# Patient Record
Sex: FEMALE | Race: WHITE | NOT HISPANIC OR LATINO | Employment: OTHER | URBAN - METROPOLITAN AREA
[De-identification: names, ages, dates, MRNs, and addresses within clinical notes are randomized per-mention and may not be internally consistent; named-entity substitution may affect disease eponyms.]

---

## 2021-03-15 DIAGNOSIS — Z23 NEED FOR VACCINATION: ICD-10-CM

## 2023-12-29 ENCOUNTER — APPOINTMENT (OUTPATIENT)
Dept: RADIOLOGY | Facility: MEDICAL CENTER | Age: 63
DRG: 377 | End: 2023-12-29
Attending: EMERGENCY MEDICINE

## 2023-12-29 ENCOUNTER — HOSPITAL ENCOUNTER (INPATIENT)
Facility: MEDICAL CENTER | Age: 63
LOS: 6 days | DRG: 377 | End: 2024-01-04
Attending: EMERGENCY MEDICINE | Admitting: INTERNAL MEDICINE

## 2023-12-29 ENCOUNTER — APPOINTMENT (OUTPATIENT)
Dept: RADIOLOGY | Facility: MEDICAL CENTER | Age: 63
DRG: 377 | End: 2023-12-29
Attending: INTERNAL MEDICINE

## 2023-12-29 DIAGNOSIS — G93.40 HEMORRHAGIC SHOCK AND ENCEPHALOPATHY SYNDROME (HCC): ICD-10-CM

## 2023-12-29 DIAGNOSIS — Z51.5 HOSPICE CARE: ICD-10-CM

## 2023-12-29 DIAGNOSIS — C16.8 MALIGNANT NEOPLASM OF OVERLAPPING SITES OF STOMACH (HCC): ICD-10-CM

## 2023-12-29 DIAGNOSIS — C16.9 MALIGNANT NEOPLASM OF STOMACH, UNSPECIFIED LOCATION (HCC): ICD-10-CM

## 2023-12-29 DIAGNOSIS — E87.20 METABOLIC ACIDOSIS: ICD-10-CM

## 2023-12-29 DIAGNOSIS — Z51.5 END OF LIFE CARE: ICD-10-CM

## 2023-12-29 DIAGNOSIS — R57.8 HEMORRHAGIC SHOCK AND ENCEPHALOPATHY SYNDROME (HCC): ICD-10-CM

## 2023-12-29 DIAGNOSIS — D50.0 IRON DEFICIENCY ANEMIA DUE TO CHRONIC BLOOD LOSS: ICD-10-CM

## 2023-12-29 DIAGNOSIS — Q87.89 HEMORRHAGIC SHOCK AND ENCEPHALOPATHY SYNDROME (HCC): ICD-10-CM

## 2023-12-29 DIAGNOSIS — J96.00 ACUTE RESPIRATORY FAILURE, UNSPECIFIED WHETHER WITH HYPOXIA OR HYPERCAPNIA (HCC): ICD-10-CM

## 2023-12-29 PROBLEM — D72.829 LEUKOCYTOSIS: Status: ACTIVE | Noted: 2023-12-29

## 2023-12-29 PROBLEM — J96.01 ACUTE RESPIRATORY FAILURE WITH HYPOXIA (HCC): Status: ACTIVE | Noted: 2023-12-29

## 2023-12-29 PROBLEM — R74.8 ABNORMAL TRANSAMINASES: Status: ACTIVE | Noted: 2023-12-29

## 2023-12-29 PROBLEM — D64.9 SEVERE ANEMIA: Status: ACTIVE | Noted: 2023-12-29

## 2023-12-29 PROBLEM — N17.9 ACUTE KIDNEY INJURY (HCC): Status: ACTIVE | Noted: 2023-12-29

## 2023-12-29 LAB
ABO + RH BLD: NORMAL
ABO GROUP BLD: NORMAL
ALBUMIN SERPL BCP-MCNC: 2.7 G/DL (ref 3.2–4.9)
ALBUMIN SERPL BCP-MCNC: 2.8 G/DL (ref 3.2–4.9)
ALBUMIN/GLOB SERPL: 1.1 G/DL
ALBUMIN/GLOB SERPL: 1.2 G/DL
ALP SERPL-CCNC: 163 U/L (ref 30–99)
ALP SERPL-CCNC: 279 U/L (ref 30–99)
ALT SERPL-CCNC: 639 U/L (ref 2–50)
ALT SERPL-CCNC: 720 U/L (ref 2–50)
AMMONIA PLAS-SCNC: 263 UMOL/L (ref 11–45)
AMORPH CRY #/AREA URNS HPF: PRESENT /HPF
AMPHET UR QL SCN: NEGATIVE
ANION GAP SERPL CALC-SCNC: 20 MMOL/L (ref 7–16)
ANION GAP SERPL CALC-SCNC: 22 MMOL/L (ref 7–16)
ANION GAP SERPL CALC-SCNC: 31 MMOL/L (ref 7–16)
ANION GAP SERPL CALC-SCNC: 33 MMOL/L (ref 7–16)
APPEARANCE UR: ABNORMAL
APTT PPP: 26.2 SEC (ref 24.7–36)
ARTERIAL PATENCY WRIST A: ABNORMAL
ARTERIAL PATENCY WRIST A: ABNORMAL
AST SERPL-CCNC: 1719 U/L (ref 12–45)
AST SERPL-CCNC: 2399 U/L (ref 12–45)
B-OH-BUTYR SERPL-MCNC: 1.1 MMOL/L (ref 0.02–0.27)
BACTERIA #/AREA URNS HPF: ABNORMAL /HPF
BARBITURATES UR QL SCN: NEGATIVE
BARCODED ABORH UBTYP: 5100
BARCODED ABORH UBTYP: 6200
BARCODED PRD CODE UBPRD: NORMAL
BARCODED UNIT NUM UBUNT: NORMAL
BASE EXCESS BLDA CALC-SCNC: -16 MMOL/L (ref -4–3)
BASE EXCESS BLDA CALC-SCNC: -28 MMOL/L (ref -4–3)
BASOPHILS # BLD AUTO: 0.1 % (ref 0–1.8)
BASOPHILS # BLD: 0.04 K/UL (ref 0–0.12)
BENZODIAZ UR QL SCN: NEGATIVE
BILIRUB SERPL-MCNC: 0.5 MG/DL (ref 0.1–1.5)
BILIRUB SERPL-MCNC: 1.1 MG/DL (ref 0.1–1.5)
BILIRUB UR QL STRIP.AUTO: NEGATIVE
BLD GP AB SCN SERPL QL: NORMAL
BODY TEMPERATURE: ABNORMAL DEGREES
BODY TEMPERATURE: ABNORMAL DEGREES
BREATHS SETTING VENT: 24
BREATHS SETTING VENT: 32
BUN SERPL-MCNC: 50 MG/DL (ref 8–22)
BUN SERPL-MCNC: 51 MG/DL (ref 8–22)
BUN SERPL-MCNC: 54 MG/DL (ref 8–22)
BUN SERPL-MCNC: 58 MG/DL (ref 8–22)
BZE UR QL SCN: NEGATIVE
CA-I BLD ISE-SCNC: 1.27 MMOL/L (ref 1.1–1.3)
CA-I BLD ISE-SCNC: 1.28 MMOL/L (ref 1.1–1.3)
CA-I SERPL-SCNC: 1.1 MMOL/L (ref 1.1–1.3)
CA-I SERPL-SCNC: 1.3 MMOL/L (ref 1.1–1.3)
CALCIUM ALBUM COR SERPL-MCNC: 10 MG/DL (ref 8.5–10.5)
CALCIUM ALBUM COR SERPL-MCNC: 10.4 MG/DL (ref 8.5–10.5)
CALCIUM SERPL-MCNC: 8.2 MG/DL (ref 8.5–10.5)
CALCIUM SERPL-MCNC: 8.3 MG/DL (ref 8.5–10.5)
CALCIUM SERPL-MCNC: 9 MG/DL (ref 8.5–10.5)
CALCIUM SERPL-MCNC: 9.4 MG/DL (ref 8.5–10.5)
CANNABINOIDS UR QL SCN: POSITIVE
CFT BLD TEG: 10.3 MIN (ref 4.6–9.1)
CFT BLD TEG: 6.5 MIN (ref 4.6–9.1)
CFT P HPASE BLD TEG: 6.2 MIN (ref 4.3–8.3)
CFT P HPASE BLD TEG: 9.2 MIN (ref 4.3–8.3)
CHLORIDE SERPL-SCNC: 100 MMOL/L (ref 96–112)
CHLORIDE SERPL-SCNC: 98 MMOL/L (ref 96–112)
CHLORIDE SERPL-SCNC: 98 MMOL/L (ref 96–112)
CHLORIDE SERPL-SCNC: 99 MMOL/L (ref 96–112)
CK SERPL-CCNC: 116 U/L (ref 0–154)
CLOT ANGLE BLD TEG: 62.8 DEGREES (ref 63–78)
CLOT ANGLE BLD TEG: 78.4 DEGREES (ref 63–78)
CLOT LYSIS 30M P MA LENFR BLD TEG: 0 % (ref 0–2.6)
CLOT LYSIS 30M P MA LENFR BLD TEG: 0 % (ref 0–2.6)
CO2 BLDA-SCNC: 11 MMOL/L (ref 20–33)
CO2 BLDA-SCNC: <10 MMOL/L (ref 20–33)
CO2 SERPL-SCNC: 13 MMOL/L (ref 20–33)
CO2 SERPL-SCNC: 15 MMOL/L (ref 20–33)
CO2 SERPL-SCNC: 5 MMOL/L (ref 20–33)
CO2 SERPL-SCNC: 7 MMOL/L (ref 20–33)
COLOR UR: YELLOW
COMPONENT F 8504F: NORMAL
COMPONENT P 8504P: NORMAL
COMPONENT R 8504R: NORMAL
CORTIS SERPL-MCNC: 58.9 UG/DL (ref 0–23)
CREAT SERPL-MCNC: 1.71 MG/DL (ref 0.5–1.4)
CREAT SERPL-MCNC: 1.79 MG/DL (ref 0.5–1.4)
CREAT SERPL-MCNC: 1.87 MG/DL (ref 0.5–1.4)
CREAT SERPL-MCNC: 1.93 MG/DL (ref 0.5–1.4)
CT.EXTRINSIC BLD ROTEM: 0.8 MIN (ref 0.8–2.1)
CT.EXTRINSIC BLD ROTEM: 2.6 MIN (ref 0.8–2.1)
DELSYS IDSYS: ABNORMAL
DELSYS IDSYS: ABNORMAL
EKG IMPRESSION: NORMAL
END TIDAL CARBON DIOXIDE IECO2: 21 MMHG
EOSINOPHIL # BLD AUTO: 1.69 K/UL (ref 0–0.51)
EOSINOPHIL NFR BLD: 3.9 % (ref 0–6.9)
EPI CELLS #/AREA URNS HPF: ABNORMAL /HPF
ERYTHROCYTE [DISTWIDTH] IN BLOOD BY AUTOMATED COUNT: 55.6 FL (ref 35.9–50)
ERYTHROCYTE [DISTWIDTH] IN BLOOD BY AUTOMATED COUNT: 56.1 FL (ref 35.9–50)
ERYTHROCYTE [DISTWIDTH] IN BLOOD BY AUTOMATED COUNT: 57.6 FL (ref 35.9–50)
ERYTHROCYTE [DISTWIDTH] IN BLOOD BY AUTOMATED COUNT: 63 FL (ref 35.9–50)
ETHANOL BLD-MCNC: <10.1 MG/DL
FENTANYL UR QL: NEGATIVE
FIBRINOGEN PPP-MCNC: 185 MG/DL (ref 215–460)
GFR SERPLBLD CREATININE-BSD FMLA CKD-EPI: 29 ML/MIN/1.73 M 2
GFR SERPLBLD CREATININE-BSD FMLA CKD-EPI: 30 ML/MIN/1.73 M 2
GFR SERPLBLD CREATININE-BSD FMLA CKD-EPI: 32 ML/MIN/1.73 M 2
GFR SERPLBLD CREATININE-BSD FMLA CKD-EPI: 34 ML/MIN/1.73 M 2
GLOBULIN SER CALC-MCNC: 2.4 G/DL (ref 1.9–3.5)
GLOBULIN SER CALC-MCNC: 2.5 G/DL (ref 1.9–3.5)
GLUCOSE BLD STRIP.AUTO-MCNC: 331 MG/DL (ref 65–99)
GLUCOSE SERPL-MCNC: 141 MG/DL (ref 65–99)
GLUCOSE SERPL-MCNC: 141 MG/DL (ref 65–99)
GLUCOSE SERPL-MCNC: 369 MG/DL (ref 65–99)
GLUCOSE SERPL-MCNC: 461 MG/DL (ref 65–99)
GLUCOSE UR STRIP.AUTO-MCNC: NEGATIVE MG/DL
HCO3 BLDA-SCNC: 3.5 MMOL/L (ref 17–25)
HCO3 BLDA-SCNC: 9.9 MMOL/L (ref 17–25)
HCT VFR BLD AUTO: 23.8 % (ref 37–47)
HCT VFR BLD AUTO: 24 % (ref 37–47)
HCT VFR BLD AUTO: 27.6 % (ref 37–47)
HCT VFR BLD AUTO: 9.3 % (ref 37–47)
HGB BLD-MCNC: 2.1 G/DL (ref 12–16)
HGB BLD-MCNC: 7.9 G/DL (ref 12–16)
HGB BLD-MCNC: 8.3 G/DL (ref 12–16)
HGB BLD-MCNC: 8.5 G/DL (ref 12–16)
HOROWITZ INDEX BLDA+IHG-RTO: 335 MM[HG]
HOROWITZ INDEX BLDA+IHG-RTO: 473 MM[HG]
HYALINE CASTS #/AREA URNS LPF: ABNORMAL /LPF
IMM GRANULOCYTES # BLD AUTO: 7.11 K/UL (ref 0–0.11)
IMM GRANULOCYTES NFR BLD AUTO: 16.5 % (ref 0–0.9)
INR PPP: 1.82 (ref 0.87–1.13)
INR PPP: 2.03 (ref 0.87–1.13)
KETONES UR STRIP.AUTO-MCNC: ABNORMAL MG/DL
LACTATE SERPL-SCNC: 13.2 MMOL/L (ref 0.5–2)
LACTATE SERPL-SCNC: 20.1 MMOL/L (ref 0.5–2)
LACTATE SERPL-SCNC: 4.4 MMOL/L (ref 0.5–2)
LACTATE SERPL-SCNC: 7.2 MMOL/L (ref 0.5–2)
LDH SERPL L TO P-CCNC: >2500 U/L (ref 107–266)
LEUKOCYTE ESTERASE UR QL STRIP.AUTO: NEGATIVE
LIPASE SERPL-CCNC: 44 U/L (ref 11–82)
LYMPHOCYTES # BLD AUTO: 6.41 K/UL (ref 1–4.8)
LYMPHOCYTES NFR BLD: 14.9 % (ref 22–41)
MAGNESIUM SERPL-MCNC: 3.1 MG/DL (ref 1.5–2.5)
MCF BLD TEG: 62.2 MM (ref 52–69)
MCF BLD TEG: ABNORMAL MM (ref 52–69)
MCF.PLATELET INHIB BLD ROTEM: 17.2 MM (ref 15–32)
MCF.PLATELET INHIB BLD ROTEM: 29 MM (ref 15–32)
MCH RBC QN AUTO: 18.4 PG (ref 27–33)
MCH RBC QN AUTO: 25.6 PG (ref 27–33)
MCH RBC QN AUTO: 25.9 PG (ref 27–33)
MCH RBC QN AUTO: 26 PG (ref 27–33)
MCHC RBC AUTO-ENTMCNC: 22.6 G/DL (ref 32.2–35.5)
MCHC RBC AUTO-ENTMCNC: 30.8 G/DL (ref 32.2–35.5)
MCHC RBC AUTO-ENTMCNC: 32.9 G/DL (ref 32.2–35.5)
MCHC RBC AUTO-ENTMCNC: 34.9 G/DL (ref 32.2–35.5)
MCV RBC AUTO: 74.6 FL (ref 81.4–97.8)
MCV RBC AUTO: 77.9 FL (ref 81.4–97.8)
MCV RBC AUTO: 81.6 FL (ref 81.4–97.8)
MCV RBC AUTO: 84.1 FL (ref 81.4–97.8)
METHADONE UR QL SCN: NEGATIVE
MICRO URNS: ABNORMAL
MODE IMODE: ABNORMAL
MODE IMODE: ABNORMAL
MONOCYTES # BLD AUTO: 3.12 K/UL (ref 0–0.85)
MONOCYTES NFR BLD AUTO: 7.2 % (ref 0–13.4)
NEUTROPHILS # BLD AUTO: 24.73 K/UL (ref 1.82–7.42)
NEUTROPHILS NFR BLD: 57.4 % (ref 44–72)
NITRITE UR QL STRIP.AUTO: NEGATIVE
NRBC # BLD AUTO: 4.02 K/UL
NRBC BLD-RTO: 9.3 /100 WBC (ref 0–0.2)
O2/TOTAL GAS SETTING VFR VENT: 100 %
O2/TOTAL GAS SETTING VFR VENT: 100 %
OPIATES UR QL SCN: NEGATIVE
OXYCODONE UR QL SCN: NEGATIVE
PA AA BLD-ACNC: 33.7 % (ref 0–11)
PA AA BLD-ACNC: ABNORMAL % (ref 0–11)
PA ADP BLD-ACNC: ABNORMAL % (ref 0–17)
PA ADP BLD-ACNC: ABNORMAL % (ref 0–17)
PCO2 BLDA: 22.7 MMHG (ref 26–37)
PCO2 BLDA: 26.5 MMHG (ref 26–37)
PCO2 TEMP ADJ BLDA: 20.5 MMHG (ref 26–37)
PCO2 TEMP ADJ BLDA: 24.4 MMHG (ref 26–37)
PCP UR QL SCN: NEGATIVE
PEEP END EXPIRATORY PRESSURE IPEEP: 8 CMH20
PEEP END EXPIRATORY PRESSURE IPEEP: 8 CMH20
PH BLDA: 6.73 [PH] (ref 7.4–7.5)
PH BLDA: 7.25 [PH] (ref 7.4–7.5)
PH TEMP ADJ BLDA: 6.75 [PH] (ref 7.4–7.5)
PH TEMP ADJ BLDA: 7.28 [PH] (ref 7.4–7.5)
PH UR STRIP.AUTO: 5 [PH] (ref 5–8)
PHOSPHATE SERPL-MCNC: 9.9 MG/DL (ref 2.5–4.5)
PLATELET # BLD AUTO: 182 K/UL (ref 164–446)
PLATELET # BLD AUTO: 183 K/UL (ref 164–446)
PLATELET # BLD AUTO: 233 K/UL (ref 164–446)
PLATELET # BLD AUTO: 513 K/UL (ref 164–446)
PMV BLD AUTO: 9 FL (ref 9–12.9)
PMV BLD AUTO: 9.2 FL (ref 9–12.9)
PMV BLD AUTO: 9.4 FL (ref 9–12.9)
PMV BLD AUTO: 9.4 FL (ref 9–12.9)
PO2 BLDA: 335 MMHG (ref 64–87)
PO2 BLDA: 473 MMHG (ref 64–87)
PO2 TEMP ADJ BLDA: 326 MMHG (ref 64–87)
PO2 TEMP ADJ BLDA: 458 MMHG (ref 64–87)
POTASSIUM BLD-SCNC: 4.7 MMOL/L (ref 3.6–5.5)
POTASSIUM BLD-SCNC: 5.1 MMOL/L (ref 3.6–5.5)
POTASSIUM SERPL-SCNC: 4.5 MMOL/L (ref 3.6–5.5)
POTASSIUM SERPL-SCNC: 4.6 MMOL/L (ref 3.6–5.5)
POTASSIUM SERPL-SCNC: 4.9 MMOL/L (ref 3.6–5.5)
POTASSIUM SERPL-SCNC: 5.4 MMOL/L (ref 3.6–5.5)
PRODUCT TYPE UPROD: NORMAL
PROPOXYPH UR QL SCN: NEGATIVE
PROT SERPL-MCNC: 5.2 G/DL (ref 6–8.2)
PROT SERPL-MCNC: 5.2 G/DL (ref 6–8.2)
PROT UR QL STRIP: 100 MG/DL
PROTHROMBIN TIME: 21.3 SEC (ref 12–14.6)
PROTHROMBIN TIME: 23.2 SEC (ref 12–14.6)
RBC # BLD AUTO: 1.14 M/UL (ref 4.2–5.4)
RBC # BLD AUTO: 3.08 M/UL (ref 4.2–5.4)
RBC # BLD AUTO: 3.19 M/UL (ref 4.2–5.4)
RBC # BLD AUTO: 3.28 M/UL (ref 4.2–5.4)
RBC # URNS HPF: ABNORMAL /HPF
RBC UR QL AUTO: ABNORMAL
RENAL EPI CELLS #/AREA URNS HPF: ABNORMAL /HPF
RH BLD: NORMAL
SAO2 % BLDA: 100 % (ref 93–99)
SAO2 % BLDA: 100 % (ref 93–99)
SODIUM BLD-SCNC: 131 MMOL/L (ref 135–145)
SODIUM BLD-SCNC: 134 MMOL/L (ref 135–145)
SODIUM SERPL-SCNC: 133 MMOL/L (ref 135–145)
SODIUM SERPL-SCNC: 135 MMOL/L (ref 135–145)
SODIUM SERPL-SCNC: 136 MMOL/L (ref 135–145)
SODIUM SERPL-SCNC: 137 MMOL/L (ref 135–145)
SP GR UR STRIP.AUTO: 1.02
SPECIMEN DRAWN FROM PATIENT: ABNORMAL
SPECIMEN DRAWN FROM PATIENT: ABNORMAL
TEG ALGORITHM TGALG: ABNORMAL
TEG ALGORITHM TGALG: ABNORMAL
TIDAL VOLUME IVT: 350 ML
TIDAL VOLUME IVT: 350 ML
TRIGL SERPL-MCNC: 83 MG/DL (ref 0–149)
TROPONIN T SERPL-MCNC: 31 NG/L (ref 6–19)
TSH SERPL DL<=0.005 MIU/L-ACNC: 3.03 UIU/ML (ref 0.38–5.33)
UNIT STATUS USTAT: NORMAL
URATE SERPL-MCNC: 13.6 MG/DL (ref 1.9–8.2)
UROBILINOGEN UR STRIP.AUTO-MCNC: 1 MG/DL
WBC # BLD AUTO: 25.1 K/UL (ref 4.8–10.8)
WBC # BLD AUTO: 26.7 K/UL (ref 4.8–10.8)
WBC # BLD AUTO: 29.9 K/UL (ref 4.8–10.8)
WBC # BLD AUTO: 43.1 K/UL (ref 4.8–10.8)
WBC #/AREA URNS HPF: ABNORMAL /HPF

## 2023-12-29 PROCEDURE — 85384 FIBRINOGEN ACTIVITY: CPT | Mod: 91

## 2023-12-29 PROCEDURE — 82330 ASSAY OF CALCIUM: CPT | Mod: 91

## 2023-12-29 PROCEDURE — 96367 TX/PROPH/DG ADDL SEQ IV INF: CPT

## 2023-12-29 PROCEDURE — 83605 ASSAY OF LACTIC ACID: CPT | Mod: 91

## 2023-12-29 PROCEDURE — 83735 ASSAY OF MAGNESIUM: CPT

## 2023-12-29 PROCEDURE — 36430 TRANSFUSION BLD/BLD COMPNT: CPT

## 2023-12-29 PROCEDURE — 84295 ASSAY OF SERUM SODIUM: CPT | Mod: 91

## 2023-12-29 PROCEDURE — 700105 HCHG RX REV CODE 258: Performed by: INTERNAL MEDICINE

## 2023-12-29 PROCEDURE — 0BH17EZ INSERTION OF ENDOTRACHEAL AIRWAY INTO TRACHEA, VIA NATURAL OR ARTIFICIAL OPENING: ICD-10-PCS | Performed by: EMERGENCY MEDICINE

## 2023-12-29 PROCEDURE — 82077 ASSAY SPEC XCP UR&BREATH IA: CPT

## 2023-12-29 PROCEDURE — 85610 PROTHROMBIN TIME: CPT

## 2023-12-29 PROCEDURE — XW0G886 INTRODUCTION OF MINERAL-BASED TOPICAL HEMOSTATIC AGENT INTO UPPER GI, VIA NATURAL OR ARTIFICIAL OPENING ENDOSCOPIC, NEW TECHNOLOGY GROUP 6: ICD-10-PCS | Performed by: INTERNAL MEDICINE

## 2023-12-29 PROCEDURE — 770022 HCHG ROOM/CARE - ICU (200)

## 2023-12-29 PROCEDURE — 02HV33Z INSERTION OF INFUSION DEVICE INTO SUPERIOR VENA CAVA, PERCUTANEOUS APPROACH: ICD-10-PCS | Performed by: EMERGENCY MEDICINE

## 2023-12-29 PROCEDURE — 94002 VENT MGMT INPAT INIT DAY: CPT

## 2023-12-29 PROCEDURE — 36600 WITHDRAWAL OF ARTERIAL BLOOD: CPT

## 2023-12-29 PROCEDURE — 96375 TX/PRO/DX INJ NEW DRUG ADDON: CPT

## 2023-12-29 PROCEDURE — 84443 ASSAY THYROID STIM HORMONE: CPT

## 2023-12-29 PROCEDURE — 03HY32Z INSERTION OF MONITORING DEVICE INTO UPPER ARTERY, PERCUTANEOUS APPROACH: ICD-10-PCS | Performed by: INTERNAL MEDICINE

## 2023-12-29 PROCEDURE — 94799 UNLISTED PULMONARY SVC/PX: CPT

## 2023-12-29 PROCEDURE — 86901 BLOOD TYPING SEROLOGIC RH(D): CPT

## 2023-12-29 PROCEDURE — 88342 IMHCHEM/IMCYTCHM 1ST ANTB: CPT

## 2023-12-29 PROCEDURE — 700111 HCHG RX REV CODE 636 W/ 250 OVERRIDE (IP): Performed by: EMERGENCY MEDICINE

## 2023-12-29 PROCEDURE — 99291 CRITICAL CARE FIRST HOUR: CPT | Mod: 25 | Performed by: INTERNAL MEDICINE

## 2023-12-29 PROCEDURE — 700105 HCHG RX REV CODE 258: Performed by: EMERGENCY MEDICINE

## 2023-12-29 PROCEDURE — 86850 RBC ANTIBODY SCREEN: CPT

## 2023-12-29 PROCEDURE — 700111 HCHG RX REV CODE 636 W/ 250 OVERRIDE (IP): Performed by: INTERNAL MEDICINE

## 2023-12-29 PROCEDURE — 700117 HCHG RX CONTRAST REV CODE 255: Performed by: INTERNAL MEDICINE

## 2023-12-29 PROCEDURE — 84132 ASSAY OF SERUM POTASSIUM: CPT

## 2023-12-29 PROCEDURE — 86900 BLOOD TYPING SEROLOGIC ABO: CPT

## 2023-12-29 PROCEDURE — 700101 HCHG RX REV CODE 250

## 2023-12-29 PROCEDURE — 74176 CT ABD & PELVIS W/O CONTRAST: CPT

## 2023-12-29 PROCEDURE — C9113 INJ PANTOPRAZOLE SODIUM, VIA: HCPCS | Performed by: EMERGENCY MEDICINE

## 2023-12-29 PROCEDURE — 160202 HCHG ENDO MINUTES - 1ST 30 MINS LEVEL 3: Performed by: INTERNAL MEDICINE

## 2023-12-29 PROCEDURE — 71260 CT THORAX DX C+: CPT

## 2023-12-29 PROCEDURE — 83615 LACTATE (LD) (LDH) ENZYME: CPT

## 2023-12-29 PROCEDURE — 86923 COMPATIBILITY TEST ELECTRIC: CPT

## 2023-12-29 PROCEDURE — 71045 X-RAY EXAM CHEST 1 VIEW: CPT

## 2023-12-29 PROCEDURE — 99255 IP/OBS CONSLTJ NEW/EST HI 80: CPT | Mod: 25 | Performed by: INTERNAL MEDICINE

## 2023-12-29 PROCEDURE — 88305 TISSUE EXAM BY PATHOLOGIST: CPT

## 2023-12-29 PROCEDURE — 30233R1 TRANSFUSION OF NONAUTOLOGOUS PLATELETS INTO PERIPHERAL VEIN, PERCUTANEOUS APPROACH: ICD-10-PCS | Performed by: STUDENT IN AN ORGANIZED HEALTH CARE EDUCATION/TRAINING PROGRAM

## 2023-12-29 PROCEDURE — 84100 ASSAY OF PHOSPHORUS: CPT

## 2023-12-29 PROCEDURE — 36620 INSERTION CATHETER ARTERY: CPT | Performed by: INTERNAL MEDICINE

## 2023-12-29 PROCEDURE — 160048 HCHG OR STATISTICAL LEVEL 1-5: Performed by: INTERNAL MEDICINE

## 2023-12-29 PROCEDURE — 82962 GLUCOSE BLOOD TEST: CPT

## 2023-12-29 PROCEDURE — 30233N1 TRANSFUSION OF NONAUTOLOGOUS RED BLOOD CELLS INTO PERIPHERAL VEIN, PERCUTANEOUS APPROACH: ICD-10-PCS | Performed by: STUDENT IN AN ORGANIZED HEALTH CARE EDUCATION/TRAINING PROGRAM

## 2023-12-29 PROCEDURE — 85384 FIBRINOGEN ACTIVITY: CPT

## 2023-12-29 PROCEDURE — 99291 CRITICAL CARE FIRST HOUR: CPT

## 2023-12-29 PROCEDURE — 37799 UNLISTED PX VASCULAR SURGERY: CPT

## 2023-12-29 PROCEDURE — P9016 RBC LEUKOCYTES REDUCED: HCPCS

## 2023-12-29 PROCEDURE — 85730 THROMBOPLASTIN TIME PARTIAL: CPT

## 2023-12-29 PROCEDURE — 36620 INSERTION CATHETER ARTERY: CPT

## 2023-12-29 PROCEDURE — 700101 HCHG RX REV CODE 250: Performed by: INTERNAL MEDICINE

## 2023-12-29 PROCEDURE — P9034 PLATELETS, PHERESIS: HCPCS

## 2023-12-29 PROCEDURE — 81001 URINALYSIS AUTO W/SCOPE: CPT

## 2023-12-29 PROCEDURE — 85025 COMPLETE CBC W/AUTO DIFF WBC: CPT

## 2023-12-29 PROCEDURE — 96376 TX/PRO/DX INJ SAME DRUG ADON: CPT

## 2023-12-29 PROCEDURE — 0DB68ZX EXCISION OF STOMACH, VIA NATURAL OR ARTIFICIAL OPENING ENDOSCOPIC, DIAGNOSTIC: ICD-10-PCS | Performed by: INTERNAL MEDICINE

## 2023-12-29 PROCEDURE — 82803 BLOOD GASES ANY COMBINATION: CPT

## 2023-12-29 PROCEDURE — 85576 BLOOD PLATELET AGGREGATION: CPT

## 2023-12-29 PROCEDURE — 31500 INSERT EMERGENCY AIRWAY: CPT

## 2023-12-29 PROCEDURE — 93005 ELECTROCARDIOGRAM TRACING: CPT | Performed by: INTERNAL MEDICINE

## 2023-12-29 PROCEDURE — C1751 CATH, INF, PER/CENT/MIDLINE: HCPCS

## 2023-12-29 PROCEDURE — 36415 COLL VENOUS BLD VENIPUNCTURE: CPT

## 2023-12-29 PROCEDURE — 82010 KETONE BODYS QUAN: CPT

## 2023-12-29 PROCEDURE — 84484 ASSAY OF TROPONIN QUANT: CPT

## 2023-12-29 PROCEDURE — 83690 ASSAY OF LIPASE: CPT

## 2023-12-29 PROCEDURE — 36556 INSERT NON-TUNNEL CV CATH: CPT

## 2023-12-29 PROCEDURE — 93010 ELECTROCARDIOGRAM REPORT: CPT | Performed by: INTERNAL MEDICINE

## 2023-12-29 PROCEDURE — P9017 PLASMA 1 DONOR FRZ W/IN 8 HR: HCPCS | Mod: 91

## 2023-12-29 PROCEDURE — 84550 ASSAY OF BLOOD/URIC ACID: CPT

## 2023-12-29 PROCEDURE — 82533 TOTAL CORTISOL: CPT

## 2023-12-29 PROCEDURE — 700102 HCHG RX REV CODE 250 W/ 637 OVERRIDE(OP): Performed by: INTERNAL MEDICINE

## 2023-12-29 PROCEDURE — 80048 BASIC METABOLIC PNL TOTAL CA: CPT

## 2023-12-29 PROCEDURE — 99152 MOD SED SAME PHYS/QHP 5/>YRS: CPT

## 2023-12-29 PROCEDURE — 5A1935Z RESPIRATORY VENTILATION, LESS THAN 24 CONSECUTIVE HOURS: ICD-10-PCS | Performed by: EMERGENCY MEDICINE

## 2023-12-29 PROCEDURE — 43255 EGD CONTROL BLEEDING ANY: CPT | Performed by: INTERNAL MEDICINE

## 2023-12-29 PROCEDURE — 82140 ASSAY OF AMMONIA: CPT

## 2023-12-29 PROCEDURE — 70450 CT HEAD/BRAIN W/O DYE: CPT

## 2023-12-29 PROCEDURE — 94003 VENT MGMT INPAT SUBQ DAY: CPT

## 2023-12-29 PROCEDURE — 82550 ASSAY OF CK (CPK): CPT

## 2023-12-29 PROCEDURE — 87040 BLOOD CULTURE FOR BACTERIA: CPT | Mod: 91

## 2023-12-29 PROCEDURE — 80307 DRUG TEST PRSMV CHEM ANLYZR: CPT

## 2023-12-29 PROCEDURE — 700101 HCHG RX REV CODE 250: Performed by: EMERGENCY MEDICINE

## 2023-12-29 PROCEDURE — 85027 COMPLETE CBC AUTOMATED: CPT | Mod: 91

## 2023-12-29 PROCEDURE — 43239 EGD BIOPSY SINGLE/MULTIPLE: CPT | Performed by: INTERNAL MEDICINE

## 2023-12-29 PROCEDURE — 96365 THER/PROPH/DIAG IV INF INIT: CPT

## 2023-12-29 PROCEDURE — 99292 CRITICAL CARE ADDL 30 MIN: CPT | Mod: 25 | Performed by: INTERNAL MEDICINE

## 2023-12-29 PROCEDURE — 85347 COAGULATION TIME ACTIVATED: CPT

## 2023-12-29 PROCEDURE — 302214 INTUBATION BOX: Performed by: EMERGENCY MEDICINE

## 2023-12-29 PROCEDURE — 84478 ASSAY OF TRIGLYCERIDES: CPT

## 2023-12-29 PROCEDURE — C9113 INJ PANTOPRAZOLE SODIUM, VIA: HCPCS | Performed by: INTERNAL MEDICINE

## 2023-12-29 PROCEDURE — 88341 IMHCHEM/IMCYTCHM EA ADD ANTB: CPT | Mod: 91

## 2023-12-29 PROCEDURE — 80053 COMPREHEN METABOLIC PANEL: CPT

## 2023-12-29 RX ORDER — NOREPINEPHRINE BITARTRATE 0.03 MG/ML
0-1 INJECTION, SOLUTION INTRAVENOUS CONTINUOUS
Status: DISCONTINUED | OUTPATIENT
Start: 2023-12-29 | End: 2023-12-30

## 2023-12-29 RX ORDER — PANTOPRAZOLE SODIUM 40 MG/10ML
40 INJECTION, POWDER, LYOPHILIZED, FOR SOLUTION INTRAVENOUS 2 TIMES DAILY
Status: DISCONTINUED | OUTPATIENT
Start: 2023-12-29 | End: 2024-01-04 | Stop reason: HOSPADM

## 2023-12-29 RX ORDER — CALCIUM CHLORIDE 100 MG/ML
1 INJECTION INTRAVENOUS; INTRAVENTRICULAR ONCE
Status: COMPLETED | OUTPATIENT
Start: 2023-12-29 | End: 2023-12-29

## 2023-12-29 RX ORDER — NOREPINEPHRINE BITARTRATE 0.03 MG/ML
0-1 INJECTION, SOLUTION INTRAVENOUS CONTINUOUS
Status: DISCONTINUED | OUTPATIENT
Start: 2023-12-29 | End: 2023-12-29

## 2023-12-29 RX ORDER — DEXMEDETOMIDINE HYDROCHLORIDE 4 UG/ML
0-1.5 INJECTION, SOLUTION INTRAVENOUS CONTINUOUS
Status: DISCONTINUED | OUTPATIENT
Start: 2023-12-29 | End: 2023-12-30

## 2023-12-29 RX ORDER — OCTREOTIDE ACETATE 100 UG/ML
50 INJECTION, SOLUTION INTRAVENOUS; SUBCUTANEOUS ONCE
Status: COMPLETED | OUTPATIENT
Start: 2023-12-29 | End: 2023-12-29

## 2023-12-29 RX ORDER — ROCURONIUM BROMIDE 10 MG/ML
70 INJECTION, SOLUTION INTRAVENOUS ONCE
Status: COMPLETED | OUTPATIENT
Start: 2023-12-29 | End: 2023-12-29

## 2023-12-29 RX ORDER — METRONIDAZOLE 500 MG/100ML
500 INJECTION, SOLUTION INTRAVENOUS EVERY 12 HOURS
Status: DISCONTINUED | OUTPATIENT
Start: 2023-12-29 | End: 2023-12-30

## 2023-12-29 RX ORDER — IPRATROPIUM BROMIDE AND ALBUTEROL SULFATE 2.5; .5 MG/3ML; MG/3ML
3 SOLUTION RESPIRATORY (INHALATION)
Status: DISCONTINUED | OUTPATIENT
Start: 2023-12-29 | End: 2024-01-04 | Stop reason: HOSPADM

## 2023-12-29 RX ORDER — CEFTRIAXONE 2 G/1
2000 INJECTION, POWDER, FOR SOLUTION INTRAMUSCULAR; INTRAVENOUS ONCE
Status: DISCONTINUED | OUTPATIENT
Start: 2023-12-29 | End: 2023-12-29

## 2023-12-29 RX ORDER — SODIUM CHLORIDE, SODIUM LACTATE, POTASSIUM CHLORIDE, CALCIUM CHLORIDE 600; 310; 30; 20 MG/100ML; MG/100ML; MG/100ML; MG/100ML
1000 INJECTION, SOLUTION INTRAVENOUS ONCE
Status: COMPLETED | OUTPATIENT
Start: 2023-12-29 | End: 2023-12-29

## 2023-12-29 RX ORDER — PHENYLEPHRINE HCL IN 0.9% NACL 0.5 MG/5ML
100 SYRINGE (ML) INTRAVENOUS
Status: ACTIVE | OUTPATIENT
Start: 2023-12-29 | End: 2023-12-29

## 2023-12-29 RX ORDER — SODIUM CHLORIDE, SODIUM LACTATE, POTASSIUM CHLORIDE, CALCIUM CHLORIDE 600; 310; 30; 20 MG/100ML; MG/100ML; MG/100ML; MG/100ML
1000 INJECTION, SOLUTION INTRAVENOUS ONCE
Status: COMPLETED | OUTPATIENT
Start: 2023-12-29 | End: 2023-12-30

## 2023-12-29 RX ORDER — DEXMEDETOMIDINE HYDROCHLORIDE 4 UG/ML
0-1.5 INJECTION, SOLUTION INTRAVENOUS CONTINUOUS
Status: DISCONTINUED | OUTPATIENT
Start: 2023-12-29 | End: 2023-12-29

## 2023-12-29 RX ADMIN — PANTOPRAZOLE SODIUM 8 MG/HR: 40 INJECTION, POWDER, FOR SOLUTION INTRAVENOUS at 14:24

## 2023-12-29 RX ADMIN — SODIUM CHLORIDE, POTASSIUM CHLORIDE, SODIUM LACTATE AND CALCIUM CHLORIDE 1000 ML: 600; 310; 30; 20 INJECTION, SOLUTION INTRAVENOUS at 11:34

## 2023-12-29 RX ADMIN — CEFTRIAXONE SODIUM 2000 MG: 10 INJECTION, POWDER, FOR SOLUTION INTRAVENOUS at 16:04

## 2023-12-29 RX ADMIN — PROPOFOL 10 MCG/KG/MIN: 10 INJECTION, EMULSION INTRAVENOUS at 12:48

## 2023-12-29 RX ADMIN — THIAMINE HYDROCHLORIDE 200 MG: 100 INJECTION, SOLUTION INTRAMUSCULAR; INTRAVENOUS at 17:34

## 2023-12-29 RX ADMIN — PANTOPRAZOLE SODIUM 40 MG: 40 INJECTION, POWDER, FOR SOLUTION INTRAVENOUS at 17:36

## 2023-12-29 RX ADMIN — NOREPINEPHRINE BITARTRATE 0.2 MCG/KG/MIN: 1 INJECTION, SOLUTION, CONCENTRATE INTRAVENOUS at 11:23

## 2023-12-29 RX ADMIN — IOHEXOL 100 ML: 350 INJECTION, SOLUTION INTRAVENOUS at 14:30

## 2023-12-29 RX ADMIN — MAGNESIUM SULFATE HEPTAHYDRATE: 500 INJECTION, SOLUTION INTRAMUSCULAR; INTRAVENOUS at 16:03

## 2023-12-29 RX ADMIN — INSULIN HUMAN 4 UNITS: 100 INJECTION, SOLUTION PARENTERAL at 17:33

## 2023-12-29 RX ADMIN — VASOPRESSIN 0.03 UNITS/MIN: 20 INJECTION INTRAVENOUS at 20:36

## 2023-12-29 RX ADMIN — ROCURONIUM BROMIDE 70 MG: 50 INJECTION, SOLUTION INTRAVENOUS at 11:05

## 2023-12-29 RX ADMIN — METRONIDAZOLE 500 MG: 500 INJECTION, SOLUTION INTRAVENOUS at 18:26

## 2023-12-29 RX ADMIN — OCTREOTIDE ACETATE 50 MCG: 100 INJECTION, SOLUTION INTRAVENOUS; SUBCUTANEOUS at 14:01

## 2023-12-29 RX ADMIN — PANTOPRAZOLE SODIUM 80 MG: 40 INJECTION, POWDER, FOR SOLUTION INTRAVENOUS at 12:18

## 2023-12-29 RX ADMIN — FENTANYL CITRATE 50 MCG/HR: 50 INJECTION, SOLUTION INTRAMUSCULAR; INTRAVENOUS at 21:40

## 2023-12-29 RX ADMIN — SODIUM BICARBONATE 50 MEQ: 84 INJECTION, SOLUTION INTRAVENOUS at 13:40

## 2023-12-29 RX ADMIN — DEXMEDETOMIDINE 0.2 MCG/KG/HR: 100 INJECTION, SOLUTION INTRAVENOUS at 19:39

## 2023-12-29 RX ADMIN — SODIUM CHLORIDE, POTASSIUM CHLORIDE, SODIUM LACTATE AND CALCIUM CHLORIDE 1000 ML: 600; 310; 30; 20 INJECTION, SOLUTION INTRAVENOUS at 22:37

## 2023-12-29 RX ADMIN — CALCIUM CHLORIDE 1 G: 100 INJECTION INTRAVENOUS; INTRAVENTRICULAR at 13:36

## 2023-12-29 RX ADMIN — FENTANYL CITRATE 100 MCG: 50 INJECTION, SOLUTION INTRAMUSCULAR; INTRAVENOUS at 19:45

## 2023-12-29 RX ADMIN — KETAMINE HYDROCHLORIDE 100 MG: 50 INJECTION INTRAMUSCULAR; INTRAVENOUS at 11:04

## 2023-12-29 RX ADMIN — Medication 100 MCG: at 11:06

## 2023-12-29 RX ADMIN — OCTREOTIDE ACETATE 50 MCG/HR: 200 INJECTION, SOLUTION INTRAVENOUS; SUBCUTANEOUS at 14:23

## 2023-12-29 RX ADMIN — NOREPINEPHRINE BITARTRATE 0.15 MCG/KG/MIN: 1 INJECTION, SOLUTION, CONCENTRATE INTRAVENOUS at 16:07

## 2023-12-29 RX ADMIN — FENTANYL CITRATE 100 MCG: 50 INJECTION, SOLUTION INTRAMUSCULAR; INTRAVENOUS at 18:43

## 2023-12-29 RX ADMIN — KETAMINE HYDROCHLORIDE 50 MG: 50 INJECTION INTRAMUSCULAR; INTRAVENOUS at 11:49

## 2023-12-29 ASSESSMENT — PAIN DESCRIPTION - PAIN TYPE
TYPE: ACUTE PAIN

## 2023-12-29 ASSESSMENT — FIBROSIS 4 INDEX: FIB4 SCORE: 8.09

## 2023-12-29 NOTE — ASSESSMENT & PLAN NOTE
DDx stress reaction from profound hemorrhagic shock versus leukemoid reaction with underlying malignancy  Lower suspicion for sepsis etiology  However with evidence of gastric malignancy cover GI source with ceftriaxone/Flagyl  Will send blood cultures and urinalysis

## 2023-12-29 NOTE — ASSESSMENT & PLAN NOTE
Secondary to hypovolemia/hemorrhagic shock and profound anemia  Follow urine output, renal function, avoid nephrotoxins  Flores catheter placed

## 2023-12-29 NOTE — ASSESSMENT & PLAN NOTE
Suspect acute metabolic encephalopathy secondary to anemia, GI bleed and hemorrhagic shock  CT head unremarkable  Further workup including MRI brain if no improvement and concern for brain metastases

## 2023-12-29 NOTE — ED PROVIDER NOTES
ED Provider Note    CHIEF COMPLAINT  Chief Complaint   Patient presents with    Abdominal Pain     1 month    Bloody Stools     1 week       EXTERNAL RECORDS REVIEWED  None available    HPI/ROS  LIMITATION TO HISTORY   Patient obtunded upon arrival  OUTSIDE HISTORIAN(S):  EMS provided additional history regarding course of events and information from     Natalya Allen is a 63 y.o. female who presents for altered mental status, pale low blood pressure.  The patient apparently has been feeling poor for over a month.  She had prior history of apparent heavy alcohol use but stopped drinking 6 months ago.  She had been feeling poorly.  Paramedics arrived after her  called 911.  They found her very dyspneic and pale with thready pulses.  Apparently she was somewhat oriented at that time.  Paramedics checked her blood sugar which was normal establish an IV and transported her here.  In the 20 to 30-minute transport time the patient became progressively more obtunded and altered.  There is no report of any trauma to the head neck chest abdomen or pelvis.   reports that she been having abdominal pain and black tarry stools over the past several weeks.    PAST MEDICAL HISTORY   has a past medical history of Blood transfusion, Cancer (HCC), Depression, MEDICAL HOME, Migraine, and Thyroid activity decreased.    SURGICAL HISTORY   has a past surgical history that includes breast reconstruction; abdominal hysterectomy total; tonsillectomy; and breast biopsy.    FAMILY HISTORY  Family History   Problem Relation Age of Onset    Diabetes Mother     Cancer Mother     Hypertension Father     Heart Disease Father        SOCIAL HISTORY  Social History     Tobacco Use    Smoking status: Never    Smokeless tobacco: Not on file    Tobacco comments:     Exposed to second hand smoke   Substance and Sexual Activity    Alcohol use: Yes     Comment: occasionally    Drug use: No    Sexual activity: Not on file   No alcohol  "use for 6 months by report    CURRENT MEDICATIONS  Home Medications       Reviewed by Soledad Garcia (Pharmacy Tech) on 12/29/23 at 1302  Med List Status: Unable to Obtain     Medication Last Dose Status   albuterol (VENTOLIN OR PROVENTIL) 108 (90 BASE) MCG/ACT AERS inhalation aerosol  Active   alprazolam (XANAX) 0.5 MG TABS  Active   aspirin EC (ECOTRIN) 81 MG TBEC  Active   azithromycin (ZITHROMAX) 250 MG TABS  Active   cyclobenzaprine (FLEXERIL) 10 MG TABS  Active   cyclobenzaprine (FLEXERIL) 10 MG TABS  Active   cyclobenzaprine (FLEXERIL) 10 MG TABS  Active   cyclobenzaprine (FLEXERIL) 10 MG TABS  Active   Diphenhydramine-APAP, sleep, (TYLENOL PM EXTRA STRENGTH PO)  Active   hydrocodone-acetaminophen (NORCO) 5-325 MG TABS per tablet  Active   levothyroxine (SYNTHROID) 100 MCG TABS  Active   methylPREDNISolone (MEDROL, STEFFI,) 4 MG tablet  Active   moxifloxacin (VIGAMOX) 0.5 % SOLN  Active   venlafaxine (EFFEXOR) 100 MG tablet  Active                Unknown    ALLERGIES  Allergies   Allergen Reactions    Penicillins     Sulfa Drugs        PHYSICAL EXAM  VITAL SIGNS: /70   Pulse (!) 111   Temp (!) 35.7 °C (96.3 °F)   Resp (!) 32   Ht 1.727 m (5' 8\")   Wt 61.5 kg (135 lb 9.3 oz)   SpO2 100%   BMI 20.62 kg/m²    Pulse ox interpretation: Hypoxic on 15 L  Constitutional: Obtunded minimally responsive appears critically ill, pale   HEENT: Atraumatic normocephalic, pupils are equal round reactive to light extraocular movements are intact. The nares is clear, external ears are normal, mouth shows moist mucous membranes normal dentition for age  Neck: Supple, no JVD no tracheal deviation  Cardiovascular: Tachycardic no murmur rub or gallop 2+ pulses peripherally x4  Thorax & Lungs: No respiratory distress, no wheezes rales or rhonchi, No chest tenderness.   GI: Soft nontender nondistended positive bowel sounds, no peritoneal signs no evidence of massive ascites  Skin: Cold, mottled   musculoskeletal: " Moving all extremities with full range and 5 of 5 strength no acute  deformity thready distal pulses  Neurologic: Does not respond to verbal or painful stimuli, GCS of 8 moaning incomprehensibly no seizures      DIAGNOSTIC STUDIES / PROCEDURES  Intubation Procedure Note    Indication: Respiratory failure    Consent: Unable to be obtained due to the emergent nature of this procedure.    Medications Used: ketamine intravenously and rocuronium intravenously    Procedure: The patient was placed in the appropriate position.  Cricoid pressure was utilized. glidescope was used. Intubation was performed by direct laryngoscopy using a laryngoscope and a 7.5 cuffed endotracheal tube.  The cuff was then inflated and the tube was secured appropriately at a distance of 21 cm to the dental ridge.  Initial confirmation of placement included bilateral breath sounds, an end tidal CO2 detector, absence of sounds over the stomach, tube fogging, adequate chest rise, adequate pulse oximetry reading, and improved skin color.  A chest x-ray to verify correct placement of the tube showed appropriate tube position.    The patient tolerated the procedure well.     Complications:   None      Physician procedure: Central venous catheter: Indication critically ill patient with poor peripheral IV access.  Emergent consent was obtained.  The right anterior neck was prepped with chlorhexidine x 3.  Sterile gown and drape and all universal precautions were utilized.  Sterile gloves and mask and gown were donned.  Real-time vascular ultrasound was used to cannulate the right internal jugular vein.  Dark nonpulsatile blood was aspirated.  A Seldinger wire was advanced without resistance.  The catheter was placed over the wire and the wire was removed.  All ports were flushed with sterile saline.  The line was sewn into place and sterile dressing applied.  Chest x-ray demonstrated no complications and appropriate line placement.  Blood loss less than  1 cc    LABS  Results for orders placed or performed during the hospital encounter of 12/29/23   Prothrombin Time   Result Value Ref Range    PT 23.2 (H) 12.0 - 14.6 sec    INR 2.03 (H) 0.87 - 1.13   APTT   Result Value Ref Range    APTT 26.2 24.7 - 36.0 sec   COD - Adult (Type and Screen)   Result Value Ref Range    ABO Grouping Only O     Rh Grouping Only POS     Antibody Screen-Cod NEG    AMMONIA   Result Value Ref Range    Ammonia 263 (HH) 11 - 45 umol/L   URINE DRUG SCREEN   Result Value Ref Range    Amphetamines Urine Negative Negative    Barbiturates Negative Negative    Benzodiazepines Negative Negative    Cocaine Metabolite Negative Negative    Fentanyl, Urine Negative Negative    Methadone Negative Negative    Opiates Negative Negative    Oxycodone Negative Negative    Phencyclidine -Pcp Negative Negative    Propoxyphene Negative Negative    Cannabinoid Metab Positive (A) Negative   ABO Rh Confirm   Result Value Ref Range    ABO Rh Confirm O POS    PLATELET MAPPING WITH BASIC TEG   Result Value Ref Range    Reaction Time Initial-R 6.5 4.6 - 9.1 min    React Time Initial Hep 6.2 4.3 - 8.3 min    Clot Kinetics-K 0.8 0.8 - 2.1 min    Clot Angle-Angle 78.4 (H) 63.0 - 78.0 degrees    Maximum Clot Strength-MA SEE COMMENT 52.0 - 69.0 mm    TEG Functional Fibrinogen(MA) 29.0 15.0 - 32.0 mm    Lysis 30 minutes-LY30 0.0 0.0 - 2.6 %    % Inhibition ADP SEE COMMENT 0.0 - 17.0 %    % Inhibition AA SEE COMMENT 0.0 - 11.0 %    TEG Algorithm Link Algorithm    MASSIVE TRANSFUSION   Result Value Ref Range    Component R       R33                 Red Blood Cells     [id number]   issued       12/29/23   11:51      Product Type Red Blood Cells LR Pheresis     Dispense Status issued     Unit Number (Barcoded) [id number]     Product Code (Barcoded) Q4225Y44     Blood Type (Barcoded) 5100     Component R       R99                 Red Cells, LR       [id number]   issued       12/29/23   11:51      Product Type R99      Dispense Status issued     Unit Number (Barcoded) [id number]     Product Code (Barcoded) G3990F27     Blood Type (Barcoded) 5100     Component R       R99                 Red Cells, LR       [id number]   issued       12/29/23   11:51      Product Type R99     Dispense Status issued     Unit Number (Barcoded) [id number]     Product Code (Barcoded) D7588I97     Blood Type (Barcoded) 5100     Component R       R4                  Red Blood Cells     [id number]   issued       12/29/23   11:51      Product Type Red Blood Cells LR Pheresis     Dispense Status issued     Unit Number (Barcoded) [id number]     Product Code (Barcoded) Z4189B55     Blood Type (Barcoded) 5100     Component F       TA                  Thawed Plasma       [id number]   issued       12/29/23   11:51      Product Type Thawed Apheresis Plasma     Dispense Status issued     Unit Number (Barcoded) [id number]     Product Code (Barcoded) Y5234M54     Blood Type (Barcoded) 6200     Component F       TA                  Thawed Plasma       [id number]   issued       12/29/23   11:51      Product Type Thawed Apheresis Plasma     Dispense Status issued     Unit Number (Barcoded) [id number]     Product Code (Barcoded) P2326V94     Blood Type (Barcoded) 6200     Component F       TA                  Thawed Plasma       [id number]   issued       12/29/23   11:51      Product Type Thawed Apheresis Plasma     Dispense Status issued     Unit Number (Barcoded) [id number]     Product Code (Barcoded) X3599M98     Blood Type (Barcoded) 6200     Component F       TA                  Thawed Plasma       [id number]   issued       12/29/23   11:51      Product Type Thawed Apheresis Plasma     Dispense Status issued     Unit Number (Barcoded) [id number]     Product Code (Barcoded) N8464Q64     Blood Type (Barcoded) 6200     Component P       P72                 Plts,Pheresis       [id number]   issued       12/29/23   11:51       Product Type Platelets Pheresis LR     Dispense Status issued     Unit Number (Barcoded) [id number]     Product Code (Barcoded) K8143O28     Blood Type (Barcoded) 6200    CBC WITH DIFFERENTIAL   Result Value Ref Range    WBC 43.1 (H) 4.8 - 10.8 K/uL    RBC 1.14 (L) 4.20 - 5.40 M/uL    Hemoglobin 2.1 (LL) 12.0 - 16.0 g/dL    Hematocrit 9.3 (LL) 37.0 - 47.0 %    MCV 81.6 81.4 - 97.8 fL    MCH 18.4 (L) 27.0 - 33.0 pg    MCHC 22.6 (L) 32.2 - 35.5 g/dL    RDW 56.1 (H) 35.9 - 50.0 fL    Platelet Count 513 (H) 164 - 446 K/uL    MPV 9.4 9.0 - 12.9 fL    Neutrophils-Polys 57.40 44.00 - 72.00 %    Lymphocytes 14.90 (L) 22.00 - 41.00 %    Monocytes 7.20 0.00 - 13.40 %    Eosinophils 3.90 0.00 - 6.90 %    Basophils 0.10 0.00 - 1.80 %    Immature Granulocytes 16.50 (H) 0.00 - 0.90 %    Nucleated RBC 9.30 (H) 0.00 - 0.20 /100 WBC    Neutrophils (Absolute) 24.73 (H) 1.82 - 7.42 K/uL    Lymphs (Absolute) 6.41 (H) 1.00 - 4.80 K/uL    Monos (Absolute) 3.12 (H) 0.00 - 0.85 K/uL    Eos (Absolute) 1.69 (H) 0.00 - 0.51 K/uL    Baso (Absolute) 0.04 0.00 - 0.12 K/uL    Immature Granulocytes (abs) 7.11 (H) 0.00 - 0.11 K/uL    NRBC (Absolute) 4.02 K/uL   Comp Metabolic Panel   Result Value Ref Range    Sodium 136 135 - 145 mmol/L    Potassium 5.4 3.6 - 5.5 mmol/L    Chloride 98 96 - 112 mmol/L    Co2 5 (LL) 20 - 33 mmol/L    Anion Gap 33.0 (H) 7.0 - 16.0    Glucose 141 (H) 65 - 99 mg/dL    Bun 50 (H) 8 - 22 mg/dL    Creatinine 1.93 (H) 0.50 - 1.40 mg/dL    Calcium 9.0 8.5 - 10.5 mg/dL    Correct Calcium 10.0 8.5 - 10.5 mg/dL    AST(SGOT) 1719 (HH) 12 - 45 U/L    ALT(SGPT) 639 (H) 2 - 50 U/L    Alkaline Phosphatase 163 (H) 30 - 99 U/L    Total Bilirubin 0.5 0.1 - 1.5 mg/dL    Albumin 2.7 (L) 3.2 - 4.9 g/dL    Total Protein 5.2 (L) 6.0 - 8.2 g/dL    Globulin 2.5 1.9 - 3.5 g/dL    A-G Ratio 1.1 g/dL   LIPASE   Result Value Ref Range    Lipase 44 11 - 82 U/L   LACTIC ACID   Result Value Ref Range    Lactic Acid 20.1 (HH) 0.5 - 2.0 mmol/L    MAGNESIUM   Result Value Ref Range    Magnesium 3.1 (H) 1.5 - 2.5 mg/dL   PHOSPHORUS   Result Value Ref Range    Phosphorus 9.9 (H) 2.5 - 4.5 mg/dL   TROPONIN   Result Value Ref Range    Troponin T 31 (H) 6 - 19 ng/L   Triglyceride   Result Value Ref Range    Triglycerides 83 0 - 149 mg/dL   ESTIMATED GFR   Result Value Ref Range    GFR (CKD-EPI) 29 (A) >60 mL/min/1.73 m 2   DIAGNOSTIC ALCOHOL   Result Value Ref Range    Diagnostic Alcohol <10.1 <10.1 mg/dL   CBC WITHOUT DIFFERENTIAL   Result Value Ref Range    WBC 25.1 (H) 4.8 - 10.8 K/uL    RBC 3.28 (L) 4.20 - 5.40 M/uL    Hemoglobin 8.5 (L) 12.0 - 16.0 g/dL    Hematocrit 27.6 (L) 37.0 - 47.0 %    MCV 84.1 81.4 - 97.8 fL    MCH 25.9 (L) 27.0 - 33.0 pg    MCHC 30.8 (L) 32.2 - 35.5 g/dL    RDW 63.0 (H) 35.9 - 50.0 fL    Platelet Count 233 164 - 446 K/uL    MPV 9.0 9.0 - 12.9 fL   LACTIC ACID   Result Value Ref Range    Lactic Acid 13.2 (HH) 0.5 - 2.0 mmol/L   PLATELET MAPPING WITH BASIC TEG   Result Value Ref Range    Reaction Time Initial-R 10.3 (H) 4.6 - 9.1 min    React Time Initial Hep 9.2 (H) 4.3 - 8.3 min    Clot Kinetics-K 2.6 (H) 0.8 - 2.1 min    Clot Angle-Angle 62.8 (L) 63.0 - 78.0 degrees    Maximum Clot Strength-MA 62.2 52.0 - 69.0 mm    TEG Functional Fibrinogen(MA) 17.2 15.0 - 32.0 mm    Lysis 30 minutes-LY30 0.0 0.0 - 2.6 %    % Inhibition ADP see comment 0.0 - 17.0 %    % Inhibition AA 33.7 (H) 0.0 - 11.0 %    TEG Algorithm Link Algorithm    Prothrombin Time   Result Value Ref Range    PT 21.3 (H) 12.0 - 14.6 sec    INR 1.82 (H) 0.87 - 1.13   IONIZED CALCIUM   Result Value Ref Range    Ionized Calcium 1.3 1.1 - 1.3 mmol/L   BETA-HYDROXYBUTYRIC ACID   Result Value Ref Range    beta-Hydroxybutyric Acid 1.10 (H) 0.02 - 0.27 mmol/L   Comp Metabolic Panel   Result Value Ref Range    Sodium 137 135 - 145 mmol/L    Potassium 4.9 3.6 - 5.5 mmol/L    Chloride 99 96 - 112 mmol/L    Co2 7 (LL) 20 - 33 mmol/L    Anion Gap 31.0 (H) 7.0 - 16.0    Glucose 141 (H)  65 - 99 mg/dL    Bun 51 (H) 8 - 22 mg/dL    Creatinine 1.71 (H) 0.50 - 1.40 mg/dL    Calcium 9.4 8.5 - 10.5 mg/dL    Correct Calcium 10.4 8.5 - 10.5 mg/dL    AST(SGOT) 2399 (HH) 12 - 45 U/L    ALT(SGPT) 720 (H) 2 - 50 U/L    Alkaline Phosphatase 279 (H) 30 - 99 U/L    Total Bilirubin 1.1 0.1 - 1.5 mg/dL    Albumin 2.8 (L) 3.2 - 4.9 g/dL    Total Protein 5.2 (L) 6.0 - 8.2 g/dL    Globulin 2.4 1.9 - 3.5 g/dL    A-G Ratio 1.2 g/dL   TSH WITH REFLEX TO FT4   Result Value Ref Range    TSH 3.030 0.380 - 5.330 uIU/mL   CREATINE KINASE   Result Value Ref Range    CPK Total 116 0 - 154 U/L   FIBRINOGEN   Result Value Ref Range    Fibrinogen 185 (L) 215 - 460 mg/dL   CORTISOL   Result Value Ref Range    Cortisol 58.9 (H) 0.0 - 23.0 ug/dL   URINALYSIS    Specimen: Urine, Flores Cath   Result Value Ref Range    Micro Urine Req Microscopic    ESTIMATED GFR   Result Value Ref Range    GFR (CKD-EPI) 34 (A) >60 mL/min/1.73 m 2   EKG   Result Value Ref Range    Report       [facility]    Test Date:  2023  Pt Name:    ALEXANDRA WAGNER                 Department: [facility]  MRN:        [id number]                      Room:       [facility]  Gender:     Female                       Technician: [facility]  :        1960                   Requested By:MANDI GRIFFITH  Order #:    [id number]                    Reading MD:    Measurements  Intervals                                Axis  Rate:       111                          P:          80  MS:         120                          QRS:        19  QRSD:       109                          T:          62  QT:         351  QTc:        477    Interpretive Statements  Sinus tachycardia  Minimal ST depression, lateral leads  Compared to ECG 2009 12:40:23  ST (T wave) deviation now present  Sinus rhythm no longer present     POCT arterial blood gas device results   Result Value Ref Range    Ph 6.732 (LL) 7.400 - 7.500    Pco2 26.5 26.0 - 37.0 mmHg    Po2 335 (H) 64 - 87 mmHg    Tco2 <10 (L) 20 -  33 mmol/L    S02 100 (H) 93 - 99 %    Hco3 3.5 (L) 17.0 - 25.0 mmol/L    BE -28 (L) -4 - 3 mmol/L    Body Temp 95.2 F degrees    O2 Therapy 100 %    iPF Ratio 335     Ph Temp Sincere 6.752 (LL) 7.400 - 7.500    Pco2 Temp Co 24.4 (L) 26.0 - 37.0 mmHg    Po2 Temp Cor 326 (H) 64 - 87 mmHg    Specimen Arterial     Sandeep Test N/A     DelSys Vent     Tidal Volume 350 mL    Peep End Expiratory Pressure 8 cmh20    Set Rate 24     Mode APV-CMV    POCT sodium device results   Result Value Ref Range    Istat Sodium 131 (L) 135 - 145 mmol/L   POCT potassium device results   Result Value Ref Range    Istat Potassium 5.1 3.6 - 5.5 mmol/L   POCT ionized CA device results   Result Value Ref Range    Istat Ionized Calcium 1.28 1.10 - 1.30 mmol/L   POCT arterial blood gas device results   Result Value Ref Range    Ph 7.248 (LL) 7.400 - 7.500    Pco2 22.7 (L) 26.0 - 37.0 mmHg    Po2 473 (H) 64 - 87 mmHg    Tco2 11 (L) 20 - 33 mmol/L    S02 100 (H) 93 - 99 %    Hco3 9.9 (L) 17.0 - 25.0 mmol/L    BE -16 (L) -4 - 3 mmol/L    Body Temp 94.3 F degrees    O2 Therapy 100 %    iPF Ratio 473     Ph Temp Sincere 7.281 (LL) 7.400 - 7.500    Pco2 Temp Co 20.5 (L) 26.0 - 37.0 mmHg    Po2 Temp Cor 458 (H) 64 - 87 mmHg    Specimen Arterial     Sandeep Test N/A     DelSys Vent     End Tidal Carbon Dioxide 21 mmhg    Tidal Volume 350 mL    Peep End Expiratory Pressure 8 cmh20    Set Rate 32     Mode APV-CMV    POCT sodium device results   Result Value Ref Range    Istat Sodium 134 (L) 135 - 145 mmol/L   POCT potassium device results   Result Value Ref Range    Istat Potassium 4.7 3.6 - 5.5 mmol/L   POCT ionized CA device results   Result Value Ref Range    Istat Ionized Calcium 1.27 1.10 - 1.30 mmol/L         RADIOLOGY  I have independently interpreted the diagnostic imaging associated with this visit and am waiting the final reading from the radiologist.   My preliminary interpretation is as follows: Large mass in the left upper quadrant  Radiologist  interpretation:   CT-CHEST,ABDOMEN,PELVIS WITH   Final Result      1.  Large lobulated mass in the left upper quadrant communicates with the stomach contains multiple foci of air. The mass likely arises from the stomach and is consistent with malignancy      2.  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  Multiple hypodense hepatic masses are consistent with metastases      4.  No acute intrathoracic findings      5.  Nonspecific 3 mm right upper lobe nodule. Follow-up per oncology protocol      CT-HEAD W/O   Final Result      1.  No acute intracranial findings.      2.  Left mastoid effusion. Mucosal thickening or polyp in the right nare               DX-CHEST-PORTABLE (1 VIEW)   Final Result      1.  Endotracheal tube tip projects approximately 4.4 cm above the autumn      2.  Right internal jugular catheter tip projects over the cavoatrial junction. No pneumothorax is identified          COURSE & MEDICAL DECISION MAKING    ED Observation Status? No; Patient does not meet criteria for ED Observation.     INITIAL ASSESSMENT, COURSE AND PLAN  Care Narrative:   This is a profoundly ill 63-year-old female apparently brought in from home by ambulance.  On arrival she was pale hypotensive mottled.  Septic protocol was initiated night concerns about possible sepsis GI bleed infectious etiology such as pneumonia and/or possible urine infection drug or alcohol abuse.  Patient had very limited records upon arrival and history is obtained from EMS.  Only additional history was provided much later in the resuscitation and the  showed up and indicated the patient had been having abdominal pain with black stools.  When nursing staff evaluated the patient I immediately came to the resuscitation room.  2 large-bore IVs were established and a bolus of crystalloid was initiated.  Her conjunctive were profoundly pale and I ordered blood  "initially and almost immediately upon evaluation.  Patient remained hypotensive and blood cultures and septic protocol was initiated.  She ultimately was given a massive transfusion protocol \"red box \"for extensive blood products.  Emergent consultation with gastroenterology was obtained as well as the intensivist Dr. Ramos.  Gastroenterology will plan on performing upper endoscopy later this afternoon central line was placed as well and the patient was aggressively resuscitated.  The intensivist will follow-up on imaging studies including CT scan of the chest and cervical spine.  Patient is profoundly ill and required extensive critical care.      HYDRATION: Based on the patient's presentation of Sepsis the patient was given IV fluids. IV Hydration was used because oral hydration was not adequate alone. Upon recheck following hydration, the patient was improved.      ADDITIONAL PROBLEM LIST    DISPOSITION AND DISCUSSIONS  I have discussed management of the patient with the following physicians and KEYLA's: Discussed with intensivist, respiratory technician, gastroenterologist    Discussion of management with other QHP or appropriate source(s): None    Escalation of care considered, and ultimately not performed: Not applicable    Barriers to care at this time, including but not limited to: None.     Decision tools and prescription drugs considered including, but not limited to: None.    FINAL DIAGNOSIS  1. Hemorrhagic shock and encephalopathy syndrome (HCC)    2. Iron deficiency anemia due to chronic blood loss    3. Acute respiratory failure, unspecified whether with hypoxia or hypercapnia (HCC)    4. Metabolic acidosis         CRITICAL CARE  The very real possibilty of a deterioration of this patient's condition required the highest level of my preparedness for sudden, emergent intervention.  I provided critical care services, which included medication orders, frequent reevaluations of the patient's condition and " response to treatment, ordering and reviewing test results, and discussing the case with various consultants.  The critical care time associated with the care of the patient was 65 minutes. Review chart for interventions. This time is exclusive of any other billable procedures.     Electronically signed by: Boaz Whyte M.D., 12/29/2023 11:36 AM

## 2023-12-29 NOTE — CONSULTS
Date of Consultation:  12/29/2023    Patient: : Natalya Allen  MRN: [id number]    Referring Physician:  Dr. Boaz Whyte      GI:Rosangela Pugh M.D.     Reason for Consultation:   GI bleeding, Anemia, Hgb 2.     History of Present Illness:     The patient is a 63 years old female with medical history of breast cancer, hysterectomy, presented to inpatient GI service for hemoglobin to, symptomatic anemia.  Based on chart review, the patient had bloody bowel movement for about a week and abdominal pain for at least a month.  The patient was intubated before we saw the patient emergency room for unstable vital signs.    We saw the patient at bedside in the emergency room, no family member arrived yet.  Rapid transfusion protocol was started by emergency provider.  Abdomen is soft.  No evidence of brisk bleeding at this time.      Past Medical History:   Diagnosis Date    Blood transfusion     Cancer (HCC)     breast ca    Depression     MEDICAL HOME     Migraine     Thyroid activity decreased          Past Surgical History:   Procedure Laterality Date    ABDOMINAL HYSTERECTOMY TOTAL      BREAST BIOPSY      BREAST RECONSTRUCTION      TONSILLECTOMY         Family History   Problem Relation Age of Onset    Diabetes Mother     Cancer Mother     Hypertension Father     Heart Disease Father        Social History     Socioeconomic History    Marital status: Single   Tobacco Use    Smoking status: Never    Tobacco comments:     Exposed to second hand smoke   Substance and Sexual Activity    Alcohol use: Yes     Comment: occasionally    Drug use: No     Review of system, down, intubated.    HEENT: grossly normal. Intubated.  Cardiovascular: Please refer to primary team's daily assessment.   Lungs: Please refer to primary team's daily assessment.   Abdomen: Soft, No tenderness.  Skin: No erythema, No rash. Pale.   Lower limbs: normal, no pitting edema.            Physical Exam:  Vitals:    12/29/23 1220 12/29/23 1225 12/29/23 1230  "12/29/23 1235   BP: 130/59 133/62 (!) 147/70 (!) 154/72   Pulse: (!) 104 (!) 102 100 97   Resp: (!) 28 (!) 28 (!) 28 (!) 28   Temp:       TempSrc:       SpO2: 99% 98% 98% 99%   Weight:       Height:                 Labs:  Recent Labs     12/29/23  1154   WBC 43.1*   RBC 1.14*   HEMOGLOBIN 2.1*   HEMATOCRIT 9.3*   MCV 81.6   MCH 18.4*   MCHC 22.6*   RDW 56.1*   PLATELETCT 513*   MPV 9.4     No results for input(s): \"SODIUM\", \"POTASSIUM\", \"CHLORIDE\", \"CO2\", \"GLUCOSE\", \"BUN\", \"CPKTOTAL\" in the last 72 hours.  Recent Labs     12/29/23  1049   APTT 26.2   INR 2.03*       Recent Labs     12/29/23  1049 12/29/23  1131   INR 2.03*  --    AMMONIA  --  263*         Imaging:  DX-CHEST-PORTABLE (1 VIEW)  Narrative: 12/29/2023 11:19 AM    HISTORY/REASON FOR EXAM:  Shortness of Breath    TECHNIQUE/EXAM DESCRIPTION AND NUMBER OF VIEWS:  Single AP view of the chest.    COMPARISON: 12/24/2011    FINDINGS:    Heart: The cardiac silhouette is normal in size.    Mediastinum: Normal contours.    Lungs: Hazy opacification of the right lower lung is likely related to breast augmentation. The left lung is clear. No pneumothorax.    Pleura: No pleural fluid.    Bones: No suspicious bony lesions.    Lines/tubes: Endotracheal tube is approximately 4.4 cm above the autumn. A right internal jugular catheter tip projects over the cavoatrial junction    Miscellaneous: Bilateral axillary surgical clips  Impression: 1.  Endotracheal tube tip projects approximately 4.4 cm above the autumn    2.  Right internal jugular catheter tip projects over the cavoatrial junction. No pneumothorax is identified            Impressions:  The patient is a 63 years old female with medical history of breast cancer, hysterectomy, presented to inpatient GI service for hemoglobin to, symptomatic anemia.  Based on chart review, the patient had bloody bowel movement for about a week and abdominal pain for at least a month.  The patient was intubated before we saw the " patient emergency room for unstable vital signs.    Agree with current aggressive blood transfusion.  Continue IV PPI twice daily.  No strong evidence of advanced cirrhosis at this time; we agree with current IV octreotide infusion and ceftriaxone.    GI team will decide the timing of endoscopy later today.  Keep the patient n.p.o. for now.          This note was generated using voice recognition software which has a small chance of producing errors of grammar and possibly content. I have made every reasonable attempt to find and correct any obvious errors, but expect that some may not be found prior to finalization of this note.

## 2023-12-29 NOTE — ASSESSMENT & PLAN NOTE
Presented with acute on chronic GI bleed, dark tarry stools, dark red blood in OGT  Abdominal pain and dark stools for several weeks  CT shows gastric mass with liver metastases  DDx necrotic tumor with ulceration  Hemoglobin initially 2.1 in ED with shock requiring vasopressor therapy  Massive transfusion protocol with full red box in ED  Repeat TEG  Trend H&H, transfuse as needed  Protonix infusion, octreotide, empiric ceftriaxone  CT abdomen pelvis with gastric mass and liver metastases  Planning for urgent EGD at bedside in ICU

## 2023-12-29 NOTE — PROGRESS NOTES
1356 TICU arrival    VS:  Wt: 61.5 kg     BP: 111/58  SpO2 89%  RR 32  Temp 96.3F      4 Eyes Skin Assessment Completed by Prasanth RN and ROB Varghese.    Head WDL  Ears WDL  Nose WDL  Mouth WDL dried blood in mouth  Neck WDL  Breast/Chest Scabbed blisters scattered, large scar scab on L ribcage, large scar on R ribcage  Shoulder Blades WDL  Spine WDL  (R) Arm/Elbow/Hand Scab Scabbed blisters scattered  (L) Arm/Elbow/Hand ScabScabbed blisters scattered  Abdomen Scab Scabbed blisters scattered  Groin Scabbed blisters scattered  Scrotum/Coccyx/Buttocks WDL  (R) Leg ScabScabbed blisters scattered  (L) Leg Scab Scabbed blisters scattered  (R) Heel/Foot/Toe Redness and Blanching  (L) Heel/Foot/Toe Redness and Blanching      Devices In Places ECG, Blood Pressure Cuff, Pulse Ox, Flores, Arterial Line, SCD's, ET Tube, OG/NG, and Central Line      Interventions In Place Heel Mepilex, Sacral Mepilex, Heel Float Boots, TAP System, Pillows, Q2 Turns, Low Air Loss Mattress, Barrier Cream, ZFlo Pillow, Dri-Boy Pads, and Heels Loaded W/Pillows    Possible Skin Injury No    Pictures Uploaded Into Epic N/A  Wound Consult Placed N/A  RN Wound Prevention Protocol Ordered Yes padded bony prominence for prevention    Belongings:  Scattered silver colored jewelry placed in denture cup in patient closet

## 2023-12-29 NOTE — ED NOTES
Blood bank called to notify RN that unable to run Platelet mapping because of no value. Will try again after transfusion.

## 2023-12-29 NOTE — ASSESSMENT & PLAN NOTE
Secondary to profound anemia, hemorrhagic shock and multiple liver metastases  Trend lactic acid  Received sodium bicarbonate and aggressive resuscitation  Hemodynamics significantly improved however may be slow to clear with liver metastases and possibly alcoholic liver disease

## 2023-12-29 NOTE — ED NOTES
Pharmacy Medication Reconciliation    ~Unable to complete Medication Reconciliation at this time. Patient is not able to participate in interview.   ~Pharmacy on file has not filled any medications for patient   ~Unable to reach patient spouse over the phone

## 2023-12-29 NOTE — DISCHARGE PLANNING
BENI asked to assist with locating family.  BENI placed call to Pt's listed significant other, Ike.  He stated that he would be on his way to [facility].     Per Ike Pt has no NOK.  He stated that both of her parent's and her two brother's have passed away and she never had children.

## 2023-12-29 NOTE — ASSESSMENT & PLAN NOTE
Initial hemoglobin 2.1 indicating component of chronic anemia  Massive transfusion protocol in ED  Continue to trend hemoglobin with transfusion as needed

## 2023-12-29 NOTE — ED NOTES
1100 YURIY Isabell remains at bedside, will intubate for airway protection, RT and pharmacy called to bedside.   1103 norepinephrine started at 0.2 mcg/kg/min  1104 100 ketamine  1105 70 rocuronium, 81/51, HR 99  1106 100 mcg phenyephrine, pt preoxygenated   1108 pt intubated with 7.5 ETT at 24 cm at the tooth, positive color change, equal breath sounds  1110 post intubation, 124/55,

## 2023-12-29 NOTE — ED NOTES
Report to ROB Varghese. Pt to CT with ACLS RN x 2 and RT. VSS on ventilator.     Critical results sent to Dr. Ramos at 1342  AST 1719  CO2 5  Phos 10  Lactic 20.1

## 2023-12-29 NOTE — ED TRIAGE NOTES
Vitals:    12/29/23 1043   BP: (!) 80/56   Pulse: (!) 101   Resp: (!) 26   Temp: (!) 35.1 °C (95.2 °F)   SpO2: 92%     Chief Complaint   Patient presents with    Abdominal Pain     1 month    Bloody Stools     1 week     Per pt's  pt has had abdominal pain for the last month and possibly bloody stool for about a week. Today he called EMS because he thought she was having a panic attack. Upon EMS arrival pt was very tachypneic, tachycardic, and not following commands.  Pupils 5mm and sluggish, pt moaning, moving all over the gurney, she has not drank in about 6 months per .     ERP called to bedside. 2 large bore IVs established.

## 2023-12-29 NOTE — ASSESSMENT & PLAN NOTE
Intubated in ED 12/29 setting of hemorrhagic shock and altered mentation  Continue full ventilator support, not appropriate for SAT/SBT

## 2023-12-29 NOTE — CONSULTS
Critical Care Consultation    Date of consult: 12/29/2023    Referring Physician  Boaz Whyte M.D.    Reason for Consultation  Acute on chronic upper GI bleed, profound anemia, lactic acidosis, mixed shock, intubated in ED    History of Presenting Illness  63 y.o. female, PMH breast cancer prior bilateral mastectomies, back surgeries who presented 12/29/2023 with reports of feeling poorly over the past 2 months with dark stools and abdominal pain.  She has a history of heavy alcohol use but stopped drinking about 2-3 months ago as she was feeling poorly.  Her significant other called 911 as she had been progressively worse over the past several days lethargic and moaning and abdominal pain.  On EMS arrival patient was dyspneic pale with weak pulses.  During transport she became more lethargic then obtunded.  She was found to have a hemoglobin of 2.1 and was hypotensive requiring vasopressors.  She was intubated and received massive transfusion protocol with full red box in ED.  Vital signs improved.  Initial postintubation blood gas was markedly acidemic with a pH of 6.732, pCO2 of 26.  Lactic acid was 20.  After red box resuscitation vasopressors were able to be titrated off.  She received 50 mg of sodium bicarbonate and went for urgent CT head, chest abdomen pelvis.  GI was consulted with plan for urgent EGD.    Code Status  No Order    Review of Systems  Review of Systems   Unable to perform ROS: Acuity of condition       Past Medical History   has a past medical history of Blood transfusion, Cancer (HCC), Depression, MEDICAL HOME, Migraine, and Thyroid activity decreased.    Surgical History   has a past surgical history that includes breast reconstruction; abdominal hysterectomy total; tonsillectomy; and breast biopsy.    Family History  family history includes Cancer in her mother; Diabetes in her mother; Heart Disease in her father; Hypertension in her father.    Social History   reports that she has  never smoked. She does not have any smokeless tobacco history on file. She reports current alcohol use. She reports that she does not use drugs.  Long history of heavy alcohol use, none past 2-3 months that she has been feeling poorly  She has not seen doctors in many years and takes no medications currently for her significant other    Medications  Home Medications       Reviewed by Soledad Garcia (Pharmacy [facility]) on 12/29/23 at 1302  Med List Status: Unable to Obtain     Medication Last Dose Status   albuterol (VENTOLIN OR PROVENTIL) 108 (90 BASE) MCG/ACT AERS inhalation aerosol  Active   alprazolam (XANAX) 0.5 MG TABS  Active   aspirin EC (ECOTRIN) 81 MG TBEC  Active   azithromycin (ZITHROMAX) 250 MG TABS  Active   cyclobenzaprine (FLEXERIL) 10 MG TABS  Active   cyclobenzaprine (FLEXERIL) 10 MG TABS  Active   cyclobenzaprine (FLEXERIL) 10 MG TABS  Active   cyclobenzaprine (FLEXERIL) 10 MG TABS  Active   Diphenhydramine-APAP, sleep, (TYLENOL PM EXTRA STRENGTH PO)  Active   hydrocodone-acetaminophen (NORCO) 5-325 MG TABS per tablet  Active   levothyroxine (SYNTHROID) 100 MCG TABS  Active   methylPREDNISolone (MEDROL, STEFFI,) 4 MG tablet  Active   moxifloxacin (VIGAMOX) 0.5 % SOLN  Active   venlafaxine (EFFEXOR) 100 MG tablet  Active                  Current Facility-Administered Medications   Medication Dose Route Frequency Provider Last Rate Last Admin    norepinephrine (Levophed) 8 mg in 250 mL NS infusion (premix)  0-1 mcg/kg/min (Ideal) Intravenous Continuous Boaz Whyte M.D.   Stopped at 12/29/23 1210    propofol (DIPRIVAN) injection  0-80 mcg/kg/min (Ideal) Intravenous Continuous Boaz Whyte M.D. 3.8 mL/hr at 12/29/23 1248 10 mcg/kg/min at 12/29/23 1248    octreotide (SandoSTATIN) injection 50 mcg  50 mcg Intravenous Once Boaz Whyte M.D.        cefTRIAXone (Rocephin) injection 2,000 mg  2,000 mg Intravenous Once Boaz Whyte M.D.         Current Outpatient Medications   Medication Sig  Dispense Refill    cyclobenzaprine (FLEXERIL) 10 MG TABS TAKE 1 TABLET BY MOUTH 3 TIMES A DAY AS NEEDED FOR MILD PAIN 90 Tab 1    moxifloxacin (VIGAMOX) 0.5 % SOLN Place 1 Drop in right eye 3 times a day. 1 Bottle 0    cyclobenzaprine (FLEXERIL) 10 MG TABS Take 1 Tab by mouth at bedtime as needed. AS NEEDED FOR MILD PAIN 30 Tab 0    venlafaxine (EFFEXOR) 100 MG tablet Take 1 Tab by mouth 2 Times a Day. TAKE 1 TABLET BY MOUTH TWICE A DAY 60 Tab 0    azithromycin (ZITHROMAX) 250 MG TABS Take 2 tabs stat, then 1 tab daily for 4 days 6 Tab 0    methylPREDNISolone (MEDROL, STEFFI,) 4 MG tablet Take as directed 21 Tab 0    albuterol (VENTOLIN OR PROVENTIL) 108 (90 BASE) MCG/ACT AERS inhalation aerosol Inhale 2 Puffs by mouth every 6 hours as needed for Shortness of Breath. 8.5 g 0    hydrocodone-acetaminophen (NORCO) 5-325 MG TABS per tablet TAKE 1 TO 2 TABLETS ORALLY EVERY 4 HOURS AS NEEDED 60 Tab 0    alprazolam (XANAX) 0.5 MG TABS TAKE 1 TABLET ORALLY AT BEDTIME AS NEEDED 10 Tab 1    levothyroxine (SYNTHROID) 100 MCG TABS TAKE 1 TAB BY MOUTH EVERY DAY. 30 Tab 6    cyclobenzaprine (FLEXERIL) 10 MG TABS TAKE 1 TAB BY MOUTH 3 TIMES A DAY AS NEEDED FOR MILD PAIN. 90 Tab 3    cyclobenzaprine (FLEXERIL) 10 MG TABS Take 1 Tab by mouth 3 times a day as needed. AS NEEDED FOR MILD PAIN. 90 Tab 3    aspirin EC (ECOTRIN) 81 MG TBEC Take 81 mg by mouth every 48 hours as needed.        Diphenhydramine-APAP, sleep, (TYLENOL PM EXTRA STRENGTH PO) Take  by mouth.           Allergies  Allergies   Allergen Reactions    Penicillins     Sulfa Drugs        Vital Signs last 24 hours  Temp:  [35.1 °C (95.2 °F)] 35.1 °C (95.2 °F)  Pulse:  [] 97  Resp:  [21-48] 28  BP: ()/(47-72) 154/72  SpO2:  [81 %-99 %] 99 %    Physical Exam  Physical Exam  Vitals and nursing note reviewed.   Constitutional:       Comments: Sedated, full ventilator support, unresponsive   HENT:      Head: Normocephalic.      Nose: Nose normal.      Mouth/Throat:       Mouth: Mucous membranes are dry.      Comments: Pale mucous membranes, NG tube with dark blood return  Eyes:      Pupils: Pupils are equal, round, and reactive to light.   Cardiovascular:      Rate and Rhythm: Regular rhythm. Tachycardia present.      Pulses: Normal pulses.   Pulmonary:      Comments: Scattered coarse breath sounds, no wheezing, diminished, full vent support  Evidence of prior bilateral mastectomy and breast implants  Abdominal:      General: There is distension.      Palpations: Abdomen is soft.      Tenderness: There is no abdominal tenderness. There is no guarding.   Musculoskeletal:         General: No swelling or deformity.      Cervical back: Neck supple.   Skin:     General: Skin is dry.      Capillary Refill: Capillary refill takes less than 2 seconds.      Comments: Cool, pale   Neurological:      Comments: Sedated, unresponsive         Fluids  No intake or output data in the 24 hours ending 12/29/23 1321    Laboratory  Recent Results (from the past 48 hour(s))   Prothrombin Time    Collection Time: 12/29/23 10:49 AM   Result Value Ref Range    PT 23.2 (H) 12.0 - 14.6 sec    INR 2.03 (H) 0.87 - 1.13   APTT    Collection Time: 12/29/23 10:49 AM   Result Value Ref Range    APTT 26.2 24.7 - 36.0 sec   COD - Adult (Type and Screen)    Collection Time: 12/29/23 10:49 AM   Result Value Ref Range    ABO Grouping Only O     Rh Grouping Only POS     Antibody Screen-Cod NEG    ABO Rh Confirm    Collection Time: 12/29/23 10:49 AM   Result Value Ref Range    ABO Rh Confirm O POS    MASSIVE TRANSFUSION    Collection Time: 12/29/23 10:49 AM   Result Value Ref Range    Component R       R33                 Red Blood Cells     [id number]   issued       12/29/23   11:51      Product Type Red Blood Cells LR Pheresis     Dispense Status issued     Unit Number (Barcoded) [id number]     Product Code (Barcoded) U7614K24     Blood Type (Barcoded) 5100     Component R       R99                 Red Cells, LR        [id number]   issued       12/29/23   11:51      Product Type R99     Dispense Status issued     Unit Number (Barcoded) [id number]     Product Code (Barcoded) P1580I71     Blood Type (Barcoded) 5100     Component R       R99                 Red Cells, LR       [id number]   issued       12/29/23   11:51      Product Type R99     Dispense Status issued     Unit Number (Barcoded) [id number]     Product Code (Barcoded) V1576T64     Blood Type (Barcoded) 5100     Component R       R4                  Red Blood Cells     [id number]   issued       12/29/23   11:51      Product Type Red Blood Cells LR Pheresis     Dispense Status issued     Unit Number (Barcoded) [id number]     Product Code (Barcoded) U2102T35     Blood Type (Barcoded) 5100     Component F       TA                  Thawed Plasma       [id number]   issued       12/29/23   11:51      Product Type Thawed Apheresis Plasma     Dispense Status issued     Unit Number (Barcoded) [id number]     Product Code (Barcoded) D5945R93     Blood Type (Barcoded) 6200     Component F       TA                  Thawed Plasma       [id number]   issued       12/29/23   11:51      Product Type Thawed Apheresis Plasma     Dispense Status issued     Unit Number (Barcoded) [id number]     Product Code (Barcoded) L8954Q71     Blood Type (Barcoded) 6200     Component F       TA                  Thawed Plasma       [id number]   issued       12/29/23   11:51      Product Type Thawed Apheresis Plasma     Dispense Status issued     Unit Number (Barcoded) [id number]     Product Code (Barcoded) R7006M90     Blood Type (Barcoded) 6200     Component F       TA                  Thawed Plasma       [id number]   issued       12/29/23   11:51      Product Type Thawed Apheresis Plasma     Dispense Status issued     Unit Number (Barcoded) [id number]     Product Code (Barcoded) V1171Q18     Blood Type (Barcoded) 6200     Component P       P72                  Plts,Pheresis       [id number]   issued       12/29/23   11:51      Product Type Platelets Pheresis LR     Dispense Status issued     Unit Number (Barcoded) [id number]     Product Code (Barcoded) M1188I81     Blood Type (Barcoded) 6200    AMMONIA    Collection Time: 12/29/23 11:31 AM   Result Value Ref Range    Ammonia 263 (HH) 11 - 45 umol/L   POCT arterial blood gas device results    Collection Time: 12/29/23 11:44 AM   Result Value Ref Range    Ph 6.732 (LL) 7.400 - 7.500    Pco2 26.5 26.0 - 37.0 mmHg    Po2 335 (H) 64 - 87 mmHg    Tco2 <10 (L) 20 - 33 mmol/L    S02 100 (H) 93 - 99 %    Hco3 3.5 (L) 17.0 - 25.0 mmol/L    BE -28 (L) -4 - 3 mmol/L    Body Temp 95.2 F degrees    O2 Therapy 100 %    iPF Ratio 335     Ph Temp Sincere 6.752 (LL) 7.400 - 7.500    Pco2 Temp Co 24.4 (L) 26.0 - 37.0 mmHg    Po2 Temp Cor 326 (H) 64 - 87 mmHg    Specimen Arterial     Sandeep Test N/A     DelSys Vent     Tidal Volume 350 mL    Peep End Expiratory Pressure 8 cmh20    Set Rate 24     Mode APV-CMV    POCT sodium device results    Collection Time: 12/29/23 11:44 AM   Result Value Ref Range    Istat Sodium 131 (L) 135 - 145 mmol/L   POCT potassium device results    Collection Time: 12/29/23 11:44 AM   Result Value Ref Range    Istat Potassium 5.1 3.6 - 5.5 mmol/L   POCT ionized CA device results    Collection Time: 12/29/23 11:44 AM   Result Value Ref Range    Istat Ionized Calcium 1.28 1.10 - 1.30 mmol/L   CBC WITH DIFFERENTIAL    Collection Time: 12/29/23 11:54 AM   Result Value Ref Range    WBC 43.1 (H) 4.8 - 10.8 K/uL    RBC 1.14 (L) 4.20 - 5.40 M/uL    Hemoglobin 2.1 (LL) 12.0 - 16.0 g/dL    Hematocrit 9.3 (LL) 37.0 - 47.0 %    MCV 81.6 81.4 - 97.8 fL    MCH 18.4 (L) 27.0 - 33.0 pg    MCHC 22.6 (L) 32.2 - 35.5 g/dL    RDW 56.1 (H) 35.9 - 50.0 fL    Platelet Count 513 (H) 164 - 446 K/uL    MPV 9.4 9.0 - 12.9 fL    Neutrophils-Polys 57.40 44.00 - 72.00 %    Lymphocytes 14.90 (L) 22.00 - 41.00 %    Monocytes 7.20 0.00 - 13.40  %    Eosinophils 3.90 0.00 - 6.90 %    Basophils 0.10 0.00 - 1.80 %    Immature Granulocytes 16.50 (H) 0.00 - 0.90 %    Nucleated RBC 9.30 (H) 0.00 - 0.20 /100 WBC    Neutrophils (Absolute) 24.73 (H) 1.82 - 7.42 K/uL    Lymphs (Absolute) 6.41 (H) 1.00 - 4.80 K/uL    Monos (Absolute) 3.12 (H) 0.00 - 0.85 K/uL    Eos (Absolute) 1.69 (H) 0.00 - 0.51 K/uL    Baso (Absolute) 0.04 0.00 - 0.12 K/uL    Immature Granulocytes (abs) 7.11 (H) 0.00 - 0.11 K/uL    NRBC (Absolute) 4.02 K/uL       Imaging  CT-CHEST,ABDOMEN,PELVIS WITH   Final Result      1.  Large lobulated mass in the left upper quadrant communicates with the stomach contains multiple foci of air. The mass likely arises from the stomach and is consistent with malignancy      2.  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  Multiple hypodense hepatic masses are consistent with metastases      4.  No acute intrathoracic findings      5.  Nonspecific 3 mm right upper lobe nodule. Follow-up per oncology protocol      CT-HEAD W/O   Final Result      1.  No acute intracranial findings.      2.  Left mastoid effusion. Mucosal thickening or polyp in the right nare               DX-CHEST-PORTABLE (1 VIEW)   Final Result      1.  Endotracheal tube tip projects approximately 4.4 cm above the autumn      2.  Right internal jugular catheter tip projects over the cavoatrial junction. No pneumothorax is identified          Assessment/Plan  * Hemorrhagic shock (HCC)- (present on admission)  Assessment & Plan  Presented with acute on chronic GI bleed, dark tarry stools, dark red blood in OGT  Abdominal pain and dark stools for several weeks  CT shows gastric mass with liver metastases  DDx necrotic tumor with ulceration  Hemoglobin initially 2.1 in ED with shock requiring vasopressor therapy  Massive transfusion protocol with full red box in ED  Repeat TEG  Trend H&H, transfuse as needed  Protonix  infusion, octreotide, empiric ceftriaxone  CT abdomen pelvis with gastric mass and liver metastases  Planning for urgent EGD at bedside in ICU    Lactic acidosis  Assessment & Plan  Secondary to profound anemia, hemorrhagic shock and multiple liver metastases  Trend lactic acid  Received sodium bicarbonate and aggressive resuscitation  Hemodynamics significantly improved however may be slow to clear with liver metastases and possibly alcoholic liver disease    Acute respiratory failure with hypoxia (HCC)  Assessment & Plan  Intubated in ED 12/29 setting of hemorrhagic shock and altered mentation  Continue full ventilator support, not appropriate for SAT/SBT    Acute encephalopathy  Assessment & Plan  Suspect acute metabolic encephalopathy secondary to anemia, GI bleed and hemorrhagic shock  CT head unremarkable  Further workup including MRI brain if no improvement and concern for brain metastases    Acute kidney injury (HCC)  Assessment & Plan  Secondary to hypovolemia/hemorrhagic shock and profound anemia  Follow urine output, renal function, avoid nephrotoxins  Flores catheter placed    Severe anemia  Assessment & Plan  Initial hemoglobin 2.1 indicating component of chronic anemia  Massive transfusion protocol in ED  Continue to trend hemoglobin with transfusion as needed    History of breast cancer- (present on admission)  Assessment & Plan  Bilateral mastectomies  ? Recurrence with met to liver     Leukocytosis  Assessment & Plan  DDx stress reaction from profound hemorrhagic shock versus leukemoid reaction with underlying malignancy  Lower suspicion for sepsis etiology  However with evidence of gastric malignancy cover GI source with ceftriaxone/Flagyl  Will send blood cultures and urinalysis    Hypothyroidism- (present on admission)  Assessment & Plan  Per hx  Not taking any meds  F/u TFTs      Updated patient's significant other Ike at bedside in ED.  Patient has no other family members.  She and Ike have been  the other for 13 years.  Currently full CODE STATUS but will discuss goals of care shortly after EGD and discussion regarding likely metastatic malignancy.    Discussed patient condition and risk of morbidity and/or mortality with RN, RT, Pharmacy, and ERP, GI .    The patient remains critically ill.  Critical care time = 140 minutes in directly providing and coordinating critical care and extensive data review.  No time overlap and excludes procedures.

## 2023-12-30 ENCOUNTER — APPOINTMENT (OUTPATIENT)
Dept: RADIOLOGY | Facility: MEDICAL CENTER | Age: 63
DRG: 377 | End: 2023-12-30
Attending: INTERNAL MEDICINE

## 2023-12-30 PROBLEM — C16.9 GASTRIC CANCER (HCC): Status: ACTIVE | Noted: 2023-12-30

## 2023-12-30 LAB
ALBUMIN SERPL BCP-MCNC: 2.5 G/DL (ref 3.2–4.9)
ALBUMIN/GLOB SERPL: 1.1 G/DL
ALP SERPL-CCNC: 264 U/L (ref 30–99)
ALT SERPL-CCNC: 985 U/L (ref 2–50)
ANION GAP SERPL CALC-SCNC: 13 MMOL/L (ref 7–16)
ANION GAP SERPL CALC-SCNC: 15 MMOL/L (ref 7–16)
ANION GAP SERPL CALC-SCNC: 19 MMOL/L (ref 7–16)
ANISOCYTOSIS BLD QL SMEAR: ABNORMAL
AST SERPL-CCNC: 2766 U/L (ref 12–45)
BASE EXCESS BLDA CALC-SCNC: -6 MMOL/L (ref -4–3)
BASOPHILS # BLD AUTO: 0.9 % (ref 0–1.8)
BASOPHILS # BLD: 0.33 K/UL (ref 0–0.12)
BILIRUB SERPL-MCNC: 1.2 MG/DL (ref 0.1–1.5)
BODY TEMPERATURE: ABNORMAL DEGREES
BREATHS SETTING VENT: 32
BUN SERPL-MCNC: 55 MG/DL (ref 8–22)
BUN SERPL-MCNC: 56 MG/DL (ref 8–22)
BUN SERPL-MCNC: 57 MG/DL (ref 8–22)
CA-I SERPL-SCNC: 1.1 MMOL/L (ref 1.1–1.3)
CALCIUM ALBUM COR SERPL-MCNC: 8.8 MG/DL (ref 8.5–10.5)
CALCIUM SERPL-MCNC: 7.4 MG/DL (ref 8.5–10.5)
CALCIUM SERPL-MCNC: 7.6 MG/DL (ref 8.5–10.5)
CALCIUM SERPL-MCNC: 8 MG/DL (ref 8.5–10.5)
CHLORIDE SERPL-SCNC: 101 MMOL/L (ref 96–112)
CHLORIDE SERPL-SCNC: 104 MMOL/L (ref 96–112)
CHLORIDE SERPL-SCNC: 106 MMOL/L (ref 96–112)
CO2 BLDA-SCNC: 16 MMOL/L (ref 20–33)
CO2 SERPL-SCNC: 16 MMOL/L (ref 20–33)
CO2 SERPL-SCNC: 18 MMOL/L (ref 20–33)
CO2 SERPL-SCNC: 18 MMOL/L (ref 20–33)
CREAT SERPL-MCNC: 1.8 MG/DL (ref 0.5–1.4)
CREAT SERPL-MCNC: 1.91 MG/DL (ref 0.5–1.4)
CREAT SERPL-MCNC: 1.94 MG/DL (ref 0.5–1.4)
DELSYS IDSYS: ABNORMAL
END TIDAL CARBON DIOXIDE IECO2: 21 MMHG
EOSINOPHIL # BLD AUTO: 0 K/UL (ref 0–0.51)
EOSINOPHIL NFR BLD: 0 % (ref 0–6.9)
ERYTHROCYTE [DISTWIDTH] IN BLOOD BY AUTOMATED COUNT: 55.3 FL (ref 35.9–50)
ERYTHROCYTE [DISTWIDTH] IN BLOOD BY AUTOMATED COUNT: 56.5 FL (ref 35.9–50)
ERYTHROCYTE [DISTWIDTH] IN BLOOD BY AUTOMATED COUNT: 57.9 FL (ref 35.9–50)
GFR SERPLBLD CREATININE-BSD FMLA CKD-EPI: 29 ML/MIN/1.73 M 2
GFR SERPLBLD CREATININE-BSD FMLA CKD-EPI: 29 ML/MIN/1.73 M 2
GFR SERPLBLD CREATININE-BSD FMLA CKD-EPI: 32 ML/MIN/1.73 M 2
GLOBULIN SER CALC-MCNC: 2.2 G/DL (ref 1.9–3.5)
GLUCOSE BLD STRIP.AUTO-MCNC: 135 MG/DL (ref 65–99)
GLUCOSE BLD STRIP.AUTO-MCNC: 191 MG/DL (ref 65–99)
GLUCOSE BLD STRIP.AUTO-MCNC: 300 MG/DL (ref 65–99)
GLUCOSE SERPL-MCNC: 145 MG/DL (ref 65–99)
GLUCOSE SERPL-MCNC: 183 MG/DL (ref 65–99)
GLUCOSE SERPL-MCNC: 272 MG/DL (ref 65–99)
HCO3 BLDA-SCNC: 15.8 MMOL/L (ref 17–25)
HCT VFR BLD AUTO: 22.8 % (ref 37–47)
HCT VFR BLD AUTO: 23.8 % (ref 37–47)
HCT VFR BLD AUTO: 23.8 % (ref 37–47)
HGB BLD-MCNC: 7.7 G/DL (ref 12–16)
HGB BLD-MCNC: 8.1 G/DL (ref 12–16)
HGB BLD-MCNC: 8.2 G/DL (ref 12–16)
HOROWITZ INDEX BLDA+IHG-RTO: 425 MM[HG]
HYPOCHROMIA BLD QL SMEAR: ABNORMAL
LACTATE SERPL-SCNC: 4.1 MMOL/L (ref 0.5–2)
LACTATE SERPL-SCNC: 4.3 MMOL/L (ref 0.5–2)
LYMPHOCYTES # BLD AUTO: 3.2 K/UL (ref 1–4.8)
LYMPHOCYTES NFR BLD: 8.6 % (ref 22–41)
MAGNESIUM SERPL-MCNC: 2.3 MG/DL (ref 1.5–2.5)
MANUAL DIFF BLD: ABNORMAL
MCH RBC QN AUTO: 25.3 PG (ref 27–33)
MCH RBC QN AUTO: 25.6 PG (ref 27–33)
MCH RBC QN AUTO: 26 PG (ref 27–33)
MCHC RBC AUTO-ENTMCNC: 33.8 G/DL (ref 32.2–35.5)
MCHC RBC AUTO-ENTMCNC: 34 G/DL (ref 32.2–35.5)
MCHC RBC AUTO-ENTMCNC: 34.5 G/DL (ref 32.2–35.5)
MCV RBC AUTO: 74.4 FL (ref 81.4–97.8)
MCV RBC AUTO: 75 FL (ref 81.4–97.8)
MCV RBC AUTO: 76.3 FL (ref 81.4–97.8)
METAMYELOCYTES NFR BLD MANUAL: 0.9 %
MICROCYTES BLD QL SMEAR: ABNORMAL
MODE IMODE: ABNORMAL
MONOCYTES # BLD AUTO: 0.33 K/UL (ref 0–0.85)
MONOCYTES NFR BLD AUTO: 0.9 % (ref 0–13.4)
MORPHOLOGY BLD-IMP: NORMAL
NEUTROPHILS # BLD AUTO: 33 K/UL (ref 1.82–7.42)
NEUTROPHILS NFR BLD: 74.1 % (ref 44–72)
NEUTS BAND NFR BLD MANUAL: 14.6 % (ref 0–10)
NRBC # BLD AUTO: 9.49 K/UL
NRBC BLD-RTO: 25.5 /100 WBC (ref 0–0.2)
O2/TOTAL GAS SETTING VFR VENT: 40 %
PCO2 BLDA: 18.6 MMHG (ref 26–37)
PCO2 TEMP ADJ BLDA: 18.9 MMHG (ref 26–37)
PEEP END EXPIRATORY PRESSURE IPEEP: 8 CMH20
PH BLDA: 7.54 [PH] (ref 7.4–7.5)
PH TEMP ADJ BLDA: 7.53 [PH] (ref 7.4–7.5)
PHOSPHATE SERPL-MCNC: 4.1 MG/DL (ref 2.5–4.5)
PLATELET # BLD AUTO: 160 K/UL (ref 164–446)
PLATELET # BLD AUTO: 174 K/UL (ref 164–446)
PLATELET # BLD AUTO: 178 K/UL (ref 164–446)
PLATELET BLD QL SMEAR: NORMAL
PMV BLD AUTO: 10.1 FL (ref 9–12.9)
PMV BLD AUTO: 9.4 FL (ref 9–12.9)
PMV BLD AUTO: 9.5 FL (ref 9–12.9)
PO2 BLDA: 170 MMHG (ref 64–87)
PO2 TEMP ADJ BLDA: 172 MMHG (ref 64–87)
POIKILOCYTOSIS BLD QL SMEAR: NORMAL
POLYCHROMASIA BLD QL SMEAR: NORMAL
POTASSIUM SERPL-SCNC: 4.3 MMOL/L (ref 3.6–5.5)
POTASSIUM SERPL-SCNC: 4.3 MMOL/L (ref 3.6–5.5)
POTASSIUM SERPL-SCNC: 4.4 MMOL/L (ref 3.6–5.5)
PROT SERPL-MCNC: 4.7 G/DL (ref 6–8.2)
RBC # BLD AUTO: 3.04 M/UL (ref 4.2–5.4)
RBC # BLD AUTO: 3.12 M/UL (ref 4.2–5.4)
RBC # BLD AUTO: 3.2 M/UL (ref 4.2–5.4)
RBC BLD AUTO: PRESENT
SAO2 % BLDA: 100 % (ref 93–99)
SCHISTOCYTES BLD QL SMEAR: NORMAL
SODIUM SERPL-SCNC: 136 MMOL/L (ref 135–145)
SODIUM SERPL-SCNC: 137 MMOL/L (ref 135–145)
SODIUM SERPL-SCNC: 137 MMOL/L (ref 135–145)
SPECIMEN DRAWN FROM PATIENT: ABNORMAL
TIDAL VOLUME IVT: 350 ML
TOXIC GRANULES BLD QL SMEAR: NORMAL
WBC # BLD AUTO: 34 K/UL (ref 4.8–10.8)
WBC # BLD AUTO: 37.2 K/UL (ref 4.8–10.8)
WBC # BLD AUTO: 41.7 K/UL (ref 4.8–10.8)

## 2023-12-30 PROCEDURE — 82962 GLUCOSE BLOOD TEST: CPT

## 2023-12-30 PROCEDURE — 80053 COMPREHEN METABOLIC PANEL: CPT

## 2023-12-30 PROCEDURE — 700111 HCHG RX REV CODE 636 W/ 250 OVERRIDE (IP): Performed by: INTERNAL MEDICINE

## 2023-12-30 PROCEDURE — 84100 ASSAY OF PHOSPHORUS: CPT

## 2023-12-30 PROCEDURE — C9113 INJ PANTOPRAZOLE SODIUM, VIA: HCPCS | Performed by: INTERNAL MEDICINE

## 2023-12-30 PROCEDURE — A9270 NON-COVERED ITEM OR SERVICE: HCPCS | Performed by: HOSPITALIST

## 2023-12-30 PROCEDURE — 700105 HCHG RX REV CODE 258: Performed by: INTERNAL MEDICINE

## 2023-12-30 PROCEDURE — 99291 CRITICAL CARE FIRST HOUR: CPT | Performed by: INTERNAL MEDICINE

## 2023-12-30 PROCEDURE — 94799 UNLISTED PULMONARY SVC/PX: CPT

## 2023-12-30 PROCEDURE — 99255 IP/OBS CONSLTJ NEW/EST HI 80: CPT | Performed by: HOSPITALIST

## 2023-12-30 PROCEDURE — 700101 HCHG RX REV CODE 250: Performed by: INTERNAL MEDICINE

## 2023-12-30 PROCEDURE — 85007 BL SMEAR W/DIFF WBC COUNT: CPT

## 2023-12-30 PROCEDURE — 83605 ASSAY OF LACTIC ACID: CPT | Mod: 91

## 2023-12-30 PROCEDURE — 700102 HCHG RX REV CODE 250 W/ 637 OVERRIDE(OP): Performed by: HOSPITALIST

## 2023-12-30 PROCEDURE — 700105 HCHG RX REV CODE 258: Performed by: HOSPITALIST

## 2023-12-30 PROCEDURE — 85027 COMPLETE CBC AUTOMATED: CPT

## 2023-12-30 PROCEDURE — 71045 X-RAY EXAM CHEST 1 VIEW: CPT

## 2023-12-30 PROCEDURE — 37799 UNLISTED PX VASCULAR SURGERY: CPT

## 2023-12-30 PROCEDURE — 82330 ASSAY OF CALCIUM: CPT

## 2023-12-30 PROCEDURE — 770004 HCHG ROOM/CARE - ONCOLOGY PRIVATE *

## 2023-12-30 PROCEDURE — 83735 ASSAY OF MAGNESIUM: CPT

## 2023-12-30 PROCEDURE — 94003 VENT MGMT INPAT SUBQ DAY: CPT

## 2023-12-30 PROCEDURE — 80048 BASIC METABOLIC PNL TOTAL CA: CPT | Mod: 91

## 2023-12-30 PROCEDURE — 82803 BLOOD GASES ANY COMBINATION: CPT

## 2023-12-30 RX ORDER — SODIUM CHLORIDE, SODIUM LACTATE, POTASSIUM CHLORIDE, AND CALCIUM CHLORIDE .6; .31; .03; .02 G/100ML; G/100ML; G/100ML; G/100ML
500 INJECTION, SOLUTION INTRAVENOUS ONCE
Status: COMPLETED | OUTPATIENT
Start: 2023-12-30 | End: 2023-12-30

## 2023-12-30 RX ORDER — SODIUM CHLORIDE, SODIUM LACTATE, POTASSIUM CHLORIDE, CALCIUM CHLORIDE 600; 310; 30; 20 MG/100ML; MG/100ML; MG/100ML; MG/100ML
INJECTION, SOLUTION INTRAVENOUS CONTINUOUS
Status: DISCONTINUED | OUTPATIENT
Start: 2023-12-30 | End: 2024-01-04 | Stop reason: HOSPADM

## 2023-12-30 RX ORDER — OXYCODONE HYDROCHLORIDE 5 MG/1
5-10 TABLET ORAL EVERY 4 HOURS PRN
Status: DISCONTINUED | OUTPATIENT
Start: 2023-12-30 | End: 2024-01-04 | Stop reason: HOSPADM

## 2023-12-30 RX ORDER — MORPHINE SULFATE 4 MG/ML
2-4 INJECTION INTRAVENOUS
Status: DISCONTINUED | OUTPATIENT
Start: 2023-12-30 | End: 2024-01-04 | Stop reason: HOSPADM

## 2023-12-30 RX ORDER — LORAZEPAM 1 MG/1
1 TABLET ORAL EVERY 4 HOURS PRN
Status: DISCONTINUED | OUTPATIENT
Start: 2023-12-30 | End: 2024-01-04 | Stop reason: HOSPADM

## 2023-12-30 RX ADMIN — SODIUM CHLORIDE, POTASSIUM CHLORIDE, SODIUM LACTATE AND CALCIUM CHLORIDE 500 ML: 600; 310; 30; 20 INJECTION, SOLUTION INTRAVENOUS at 16:13

## 2023-12-30 RX ADMIN — SODIUM CHLORIDE, POTASSIUM CHLORIDE, SODIUM LACTATE AND CALCIUM CHLORIDE 500 ML: 600; 310; 30; 20 INJECTION, SOLUTION INTRAVENOUS at 18:30

## 2023-12-30 RX ADMIN — LORAZEPAM 1 MG: 1 TABLET ORAL at 20:51

## 2023-12-30 RX ADMIN — SODIUM CHLORIDE, POTASSIUM CHLORIDE, SODIUM LACTATE AND CALCIUM CHLORIDE: 600; 310; 30; 20 INJECTION, SOLUTION INTRAVENOUS at 18:39

## 2023-12-30 RX ADMIN — THIAMINE HYDROCHLORIDE 200 MG: 100 INJECTION, SOLUTION INTRAMUSCULAR; INTRAVENOUS at 18:00

## 2023-12-30 RX ADMIN — OXYCODONE 10 MG: 5 TABLET ORAL at 20:50

## 2023-12-30 RX ADMIN — INSULIN HUMAN 3 UNITS: 100 INJECTION, SOLUTION PARENTERAL at 00:07

## 2023-12-30 RX ADMIN — METRONIDAZOLE 500 MG: 500 INJECTION, SOLUTION INTRAVENOUS at 05:29

## 2023-12-30 RX ADMIN — PANTOPRAZOLE SODIUM 40 MG: 40 INJECTION, POWDER, FOR SOLUTION INTRAVENOUS at 18:00

## 2023-12-30 RX ADMIN — THIAMINE HYDROCHLORIDE 200 MG: 100 INJECTION, SOLUTION INTRAMUSCULAR; INTRAVENOUS at 06:50

## 2023-12-30 RX ADMIN — PANTOPRAZOLE SODIUM 40 MG: 40 INJECTION, POWDER, FOR SOLUTION INTRAVENOUS at 05:28

## 2023-12-30 RX ADMIN — INSULIN HUMAN 1 UNITS: 100 INJECTION, SOLUTION PARENTERAL at 05:33

## 2023-12-30 RX ADMIN — DEXMEDETOMIDINE 0.6 MCG/KG/HR: 100 INJECTION, SOLUTION INTRAVENOUS at 08:28

## 2023-12-30 RX ADMIN — PHYTONADIONE 10 MG: 10 INJECTION, EMULSION INTRAMUSCULAR; INTRAVENOUS; SUBCUTANEOUS at 14:12

## 2023-12-30 RX ADMIN — NOREPINEPHRINE BITARTRATE 0.11 MCG/KG/MIN: 1 INJECTION, SOLUTION, CONCENTRATE INTRAVENOUS at 02:54

## 2023-12-30 RX ADMIN — FENTANYL CITRATE 150 MCG/HR: 50 INJECTION, SOLUTION INTRAMUSCULAR; INTRAVENOUS at 07:01

## 2023-12-30 ASSESSMENT — PAIN DESCRIPTION - PAIN TYPE
TYPE: ACUTE PAIN

## 2023-12-30 ASSESSMENT — ENCOUNTER SYMPTOMS
LOSS OF CONSCIOUSNESS: 0
PALPITATIONS: 0
NAUSEA: 0
CHILLS: 0
FEVER: 0
ABDOMINAL PAIN: 1
DIARRHEA: 0
HEADACHES: 0
SORE THROAT: 0
DOUBLE VISION: 0
COUGH: 0
VOMITING: 0
SHORTNESS OF BREATH: 0
DIZZINESS: 0
BLURRED VISION: 0
BACK PAIN: 0

## 2023-12-30 ASSESSMENT — FIBROSIS 4 INDEX: FIB4 SCORE: 31.34

## 2023-12-30 NOTE — PROGRESS NOTES
MD at bedside to see the patient and the patient's significant other Ike. Extubation order placed by Dr. Ramos following the SBT.

## 2023-12-30 NOTE — CONSULTS
Hospital Medicine Consultation    Date of Service  12/30/2023    Referring Physician  LOPEZ Ramos    Consulting Physician  Leland Garcia D.O.    Reason for Consultation  Abdominal mass    History of Presenting Illness  63 y.o. female who presented 12/29/2023 with a history of breast cancer s/p B mastectomies, ETOH abuse, chronic back pain.  She quit drinking 2 months ago.  Presented with generalized wekness and melena with abdominal pain.  On presentation she was found to have an Hgb of 2.1 and was hypotensive.  Intubated and initiated on massive transfusion protocol.  She went for EGD on the same day with findings of a fungating ulcerated mass in the stomach consistent with gastric cancer.  Subsequent CT A/P showing a large gastric based mass.  Case has been reviewed with Oncology who state there is nothing they can offer her    I had a long discussion with the patient regarding her newly found cancer.  She understands that this is a terminal diagnosis and is understandably tearful.  She helped her Mom pass at home on Hospice and has some understanding of her situation as a result.      Review of Systems  Review of Systems   Constitutional:  Negative for chills and fever.   HENT:  Negative for nosebleeds and sore throat.    Eyes:  Negative for blurred vision and double vision.   Respiratory:  Negative for cough and shortness of breath.    Cardiovascular:  Negative for chest pain, palpitations and leg swelling.   Gastrointestinal:  Positive for abdominal pain. Negative for diarrhea, nausea and vomiting.   Genitourinary:  Negative for dysuria and urgency.   Musculoskeletal:  Negative for back pain.   Skin:  Negative for rash.   Neurological:  Negative for dizziness, loss of consciousness and headaches.       Past Medical History   has a past medical history of Blood transfusion, Cancer (HCC), Depression, MEDICAL HOME, Migraine, and Thyroid activity decreased.    Surgical History   has a past surgical history  that includes breast reconstruction; abdominal hysterectomy total; tonsillectomy; and breast biopsy.    Family History  family history includes Cancer in her mother; Diabetes in her mother; Heart Disease in her father; Hypertension in her father.    Social History   reports that she has never smoked. She does not have any smokeless tobacco history on file. She reports current alcohol use. She reports that she does not use drugs.    Medications  Prior to Admission Medications   Prescriptions Last Dose Informant Patient Reported? Taking?   Diphenhydramine-APAP, sleep, (TYLENOL PM EXTRA STRENGTH PO)   Yes No   Sig: Take  by mouth.     albuterol (VENTOLIN OR PROVENTIL) 108 (90 BASE) MCG/ACT AERS inhalation aerosol   No No   Sig: Inhale 2 Puffs by mouth every 6 hours as needed for Shortness of Breath.   alprazolam (XANAX) 0.5 MG TABS   No No   Sig: TAKE 1 TABLET ORALLY AT BEDTIME AS NEEDED   aspirin EC (ECOTRIN) 81 MG TBEC   Yes No   Sig: Take 81 mg by mouth every 48 hours as needed.     azithromycin (ZITHROMAX) 250 MG TABS   No No   Sig: Take 2 tabs stat, then 1 tab daily for 4 days   cyclobenzaprine (FLEXERIL) 10 MG TABS   No No   Sig: Take 1 Tab by mouth 3 times a day as needed. AS NEEDED FOR MILD PAIN.   cyclobenzaprine (FLEXERIL) 10 MG TABS   No No   Sig: TAKE 1 TAB BY MOUTH 3 TIMES A DAY AS NEEDED FOR MILD PAIN.   cyclobenzaprine (FLEXERIL) 10 MG TABS   No No   Sig: Take 1 Tab by mouth at bedtime as needed. AS NEEDED FOR MILD PAIN   cyclobenzaprine (FLEXERIL) 10 MG TABS   No No   Sig: TAKE 1 TABLET BY MOUTH 3 TIMES A DAY AS NEEDED FOR MILD PAIN   hydrocodone-acetaminophen (NORCO) 5-325 MG TABS per tablet   No No   Sig: TAKE 1 TO 2 TABLETS ORALLY EVERY 4 HOURS AS NEEDED   levothyroxine (SYNTHROID) 100 MCG TABS   No No   Sig: TAKE 1 TAB BY MOUTH EVERY DAY.   methylPREDNISolone (MEDROL, STEFFI,) 4 MG tablet   No No   Sig: Take as directed   moxifloxacin (VIGAMOX) 0.5 % SOLN   No No   Sig: Place 1 Drop in right eye 3  times a day.   venlafaxine (EFFEXOR) 100 MG tablet   No No   Sig: Take 1 Tab by mouth 2 Times a Day. TAKE 1 TABLET BY MOUTH TWICE A DAY      Facility-Administered Medications: None       Allergies  Allergies   Allergen Reactions    Penicillins     Sulfa Drugs        Physical Exam  Pulse:  [] 80  Resp:  [14-57] 18  BP: ()/(54-77) 87/54  SpO2:  [90 %-100 %] 98 %    Physical Exam  Constitutional:       General: She is not in acute distress.     Appearance: Normal appearance. She is well-developed. She is not diaphoretic.   HENT:      Head: Normocephalic and atraumatic.   Neck:      Vascular: No JVD.   Cardiovascular:      Rate and Rhythm: Normal rate and regular rhythm.      Heart sounds: No murmur heard.  Pulmonary:      Effort: Pulmonary effort is normal. No respiratory distress.      Breath sounds: No stridor. No wheezing or rales.   Abdominal:      Palpations: Abdomen is soft. There is mass.      Tenderness: There is no abdominal tenderness. There is no guarding or rebound.   Musculoskeletal:      Right lower leg: No edema.      Left lower leg: No edema.   Skin:     General: Skin is warm and dry.      Findings: No rash.   Neurological:      Mental Status: She is oriented to person, place, and time.   Psychiatric:         Mood and Affect: Mood is depressed. Affect is tearful.         Behavior: Behavior normal.         Thought Content: Thought content normal.         Fluids  Date 12/30/23 0700 - 12/31/23 0659   Shift 1703-6308 0748-0550 6250-1392 24 Hour Total   INTAKE   I.V. 267.3 0  267.3   IV Piggyback  39.6  39.6   Shift Total 267.3 39.6  306.9   OUTPUT   Urine 250   250   Emesis/NG output 150   150   Shift Total 400   400   Weight (kg) 61.5 61.5 61.5 61.5       Laboratory  Recent Labs     12/30/23  0111 12/30/23  0526 12/30/23  0918   WBC 34.0* 37.2* 41.7*   RBC 3.20* 3.04* 3.12*   HEMOGLOBIN 8.2* 7.7* 8.1*   HEMATOCRIT 23.8* 22.8* 23.8*   MCV 74.4* 75.0* 76.3*   MCH 25.6* 25.3* 26.0*   MCHC 34.5  33.8 34.0   RDW 55.3* 56.5* 57.9*   PLATELETCT 174 160* 178   MPV 9.4 9.5 10.1     Recent Labs     12/30/23  0111 12/30/23  0526 12/30/23  0918   SODIUM 136 137 137   POTASSIUM 4.4 4.3 4.3   CHLORIDE 101 104 106   CO2 16* 18* 18*   GLUCOSE 272* 183* 145*   BUN 56* 57* 55*   CREATININE 1.94* 1.91* 1.80*   CALCIUM 8.0* 7.6* 7.4*     Recent Labs     12/29/23  1049 12/29/23  1500   APTT 26.2  --    INR 2.03* 1.82*          Recent Labs     12/29/23  1154   TRIGLYCERIDE 83        Imaging  DX-CHEST-PORTABLE (1 VIEW)   Final Result      No significant interval change.      CT-ABDOMEN-PELVIS W/O   Final Result      1.  NG tube tip is in the distal stomach.   2.  Redemonstration of left upper quadrant mass, likely gastric in origin, detailed on the recent contrast-enhanced CT.   3.  Redemonstration of bilobar hepatic metastases.   4.  Findings of renal dysfunction wall multiple small bilateral renal infarcts.   5.  Spleen was better evaluated on the recent contrast-enhanced CT.   6.  Multiple retroperitoneal cheryl metastases.   7.  Trace hyperdense pelvic ascites.         DX-ABDOMEN FOR TUBE PLACEMENT   Final Result      Enteric tube tip projects over the distal stomach      CT-CHEST,ABDOMEN,PELVIS WITH   Final Result      1.  Large lobulated mass in the left upper quadrant communicates with the stomach contains multiple foci of air. The mass likely arises from the stomach and is consistent with malignancy      2.  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  Multiple hypodense hepatic masses are consistent with metastases      4.  No acute intrathoracic findings      5.  Nonspecific 3 mm right upper lobe nodule. Follow-up per oncology protocol      CT-HEAD W/O   Final Result      1.  No acute intracranial findings.      2.  Left mastoid effusion. Mucosal thickening or polyp in the right nare               DX-CHEST-PORTABLE (1 VIEW)   Final  Result      1.  Endotracheal tube tip projects approximately 4.4 cm above the autumn      2.  Right internal jugular catheter tip projects over the cavoatrial junction. No pneumothorax is identified          Assessment/Plan  * Hemorrhagic shock (HCC)- (present on admission)  Assessment & Plan  S/p resuscitation vitals are now stable  Will cont to support pt while Hospice is arranged and to give her time to put her affiars in order    Gastric cancer (HCC)  Assessment & Plan  With mets to liver  Reviewed with Oncology who recommend Hospice; consult placed  Change to DNR/I per DW pt  Cont to support with blood products if needed to allow her time to sort out her affairs and arrange home hospice where she would prefer to be with her boyfriend and her dogs  Prn orders for MSO4 IV and oxycodone as well as po ativan and IV valium    Abnormal transaminases  Assessment & Plan  Secondary to liver mets noted on CT    Acute encephalopathy  Assessment & Plan  S/p resuscitation pt is now alert and oriented    Acute kidney injury (HCC)  Assessment & Plan  Creat stable around 1.7-1.8    Acute respiratory failure with hypoxia (HCC)  Assessment & Plan  Extubated 12/30  O2/RT protocols

## 2023-12-30 NOTE — CARE PLAN
Problem: Ventilation  Goal: Ability to achieve and maintain unassisted ventilation or tolerate decreased levels of ventilator support  Description: Target End Date:  4 days     Document on Vent flowsheet    1.  Support and monitor invasive and noninvasive mechanical ventilation  2.  Monitor ventilator weaning response  3.  Perform ventilator associated pneumonia prevention interventions  4.  Manage ventilation therapy by monitoring diagnostic test results  Outcome: Not Met         Ventilator Daily Summary    Vent Day # 2    Airway:  7.5 @ 24     Ventilator settings:  apvcmv: 20/350/8/30    Weaning trials:  none    Respiratory Procedures:  none    Plan: Continue current ventilator settings and wean mechanical ventilation as tolerated per physician orders.

## 2023-12-30 NOTE — PROGRESS NOTES
Post EGD, GI MD Dr. Pugh gave the order to replace the OG tube that was in the stomach prior to the scope.

## 2023-12-30 NOTE — PROCEDURES
OPERATIVE REPORT    PATIENT:   Natalya Allen   1960       PREOPERATIVE DIAGNOSES/INDICATIONS: Upper GI bleeding     POSTOPERATIVE DIAGNOSIS:   Gastric cancer.  One fungating ulcerated mass lesion around at least 6cm wide was noted at high body post wall. Deep central ulceration with possible perforation or fistula formation. Active bleeding is noted, s/p biopsy and endoclot spray x 2.   Based on the endoscopic findings and CT scan from today, would recommend oncology, palliative/hospice consultation.       PROCEDURE:  ESOPHAGOGASTRODUODENOSCOPY    PHYSICIAN:  Rosangela Pugh MD. MPH.    ANESTHESIA:  Per anesthesiologist or ICU/ED team.     LOCATION: [facility]    CONSENT:  OBTAINED. The risks, benefits and alternatives of the procedure were discussed in details. The risks include and are not limited to bleeding, infection, perforation, missed lesions, and sedations risks (cardiopulmonary compromise and allergic reaction to medications).    DESCRIPTION: The patient presented to the procedure room.  After routine checkup was performed, patient was brought into the endoscopy suite.  Patient was placed on his left lateral decubitus position. A bite block was placed in patient's mouth. Patient was sedated by anesthesia.  Vital signs were monitored throughout the procedure.  Oxygenation support was provided throughout the procedure. Upper endoscope was inserted into patient's mouth and advanced to the second portion of the duodenum under direct visualization.      Once the site was reached and examined, the upper endoscope was withdrawn.  Retroflexion was made within the stomach.  The stomach was decompressed, scope was withdrawn and the procedure was terminated.    The patient tolerated the procedure well.  There were no immediate complications.    OPERATIVE FINDINGS:    Gastric cancer.  One fungating ulcerated mass lesion around at least 6cm wide was noted at high body post wall. Deep central ulceration with possible  perforation or fistula formation. Active bleeding is noted, s/p biopsy and endoclot spray x 2.   Based on the endoscopic findings and CT scan from today, would recommend oncology, palliative/hospice consultation.     One 1.5cm clean based ulcer at GE junction.   Grossly normal duodenum.     RECOMMENDATIONS:  Supportive care.   Not amendable to endoscopic management when rebleeds.   GI signs off.     This note has been transcribed with digital voice recognition software and although it has been reviewed may contain grammatical or word errors

## 2023-12-30 NOTE — CARE PLAN
The patient is Unstable - High likelihood or risk of patient condition declining or worsening         Progress made toward(s) clinical / shift goals:    Problem: Safety - Medical Restraint  Goal: Remains free of injury from restraints (Restraint for Interference with Medical Device)  Outcome: Progressing  Goal: Free from restraint(s) (Restraint for Interference with Medical Device)  Outcome: Progressing     Problem: Knowledge Deficit - Standard  Goal: Patient and family/care givers will demonstrate understanding of plan of care, disease process/condition, diagnostic tests and medications  Outcome: Progressing     Problem: Skin Integrity  Goal: Skin integrity is maintained or improved  Outcome: Progressing     Problem: Fall Risk  Goal: Patient will remain free from falls  Outcome: Progressing     Problem: Pain - Standard  Goal: Alleviation of pain or a reduction in pain to the patient’s comfort goal  Outcome: Progressing       Patient is not progressing towards the following goals:

## 2023-12-30 NOTE — CARE PLAN
Problem: Ventilation  Goal: Ability to achieve and maintain unassisted ventilation or tolerate decreased levels of ventilator support  Description: Target End Date:  4 days     Document on Vent flowsheet    1.  Support and monitor invasive and noninvasive mechanical ventilation  2.  Monitor ventilator weaning response  3.  Perform ventilator associated pneumonia prevention interventions  4.  Manage ventilation therapy by monitoring diagnostic test results  Outcome: Not Met  Note:   Ventilator Daily Summary    Vent Day # 1    Airway: 7.5@24    Ventilator settings: 32/350/8/60    Weaning trials: n/a    Respiratory Procedures: n/a    Plan: Continue current ventilator settings and wean mechanical ventilation as tolerated per physician orders.

## 2023-12-30 NOTE — CARE PLAN
The patient is Watcher - Medium risk of patient condition declining or worsening    Shift Goals  Clinical Goals: hemodynamic stability  Patient Goals: PRASANTH  Family Goals: PRASANTH      Problem: Knowledge Deficit - Standard  Goal: Patient and family/care givers will demonstrate understanding of plan of care, disease process/condition, diagnostic tests and medications  Outcome: Progressing     Problem: Skin Integrity  Goal: Skin integrity is maintained or improved  Outcome: Progressing     Problem: Fall Risk  Goal: Patient will remain free from falls  Outcome: Progressing     Problem: Pain - Standard  Goal: Alleviation of pain or a reduction in pain to the patient’s comfort goal  Outcome: Progressing

## 2023-12-30 NOTE — PROGRESS NOTES
"Critical Care Progress Note    Date of admission  12/29/2023    Chief Complaint  63 y.o. female admitted 12/29/2023 with Acute on chronic upper GI bleed, profound anemia, lactic acidosis, mixed shock, intubated in ED    Hospital Course  \"63 y.o. female, PMH breast cancer prior bilateral mastectomies, back surgeries who presented 12/29/2023 with reports of feeling poorly over the past 2 months with dark stools and abdominal pain.  She has a history of heavy alcohol use but stopped drinking about 2-3 months ago as she was feeling poorly.  Her significant other called 911 as she had been progressively worse over the past several days lethargic and moaning and abdominal pain.  On EMS arrival patient was dyspneic pale with weak pulses.  During transport she became more lethargic then obtunded.  She was found to have a hemoglobin of 2.1 and was hypotensive requiring vasopressors.  She was intubated and received massive transfusion protocol with full red box in ED.  Vital signs improved.  Initial postintubation blood gas was markedly acidemic with a pH of 6.732, pCO2 of 26.  Lactic acid was 20.  After red box resuscitation vasopressors were able to be titrated off.  She received 50 mg of sodium bicarbonate and went for urgent CT head, chest abdomen pelvis.  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    Interval Problem Update  Reviewed last 24 hour events:    Dex gtt  RASS -2  Fent 150  Responds, opens eyes, follows, commands; failed SAT with severe agitation   Octreotide  SR/ST  SBP   Norepi 0.17  Vaso off  NPO  OGT LIS, dark blood o/p   Tm 100  WBC 37.2  Hgb 7.7  Plts 160  Norepi  Vasopressin   VD#2: 20/350/8/30  7.53/19/172  CXR ETT OK, mild " int edema  I/O = 3086/1065  Cr 1.91  LA 4.3  IV PPI BID  Octreotide - stop  Lytes OK    CT reviewd     Update: Patient awake following commands, extubated.  Significant other Ike at bedside.  DNR/DNI consistent with patient's wishes.  Requesting home hospice.  Patient still somewhat groggy post sedation.  Will discuss goals of care, home hospice etc. in the coming hours as she becomes fully oriented.    Review of Systems  Review of Systems   Unable to perform ROS: Acuity of condition        Vital Signs for last 24 hours   Temp:  [35.1 °C (95.2 °F)-35.7 °C (96.3 °F)] 35.7 °C (96.3 °F)  Pulse:  [] 92  Resp:  [19-57] 27  BP: ()/(47-77) 128/71  SpO2:  [81 %-100 %] 92 %    Hemodynamic parameters for last 24 hours       Respiratory Information for the last 24 hours  Vent Mode: APVCMV  Rate (breaths/min): 20  Vt Target (mL): 350  PEEP/CPAP: 8  MAP: 11  Control VTE (exp VT): 359    Physical Exam   Physical Exam  Vitals and nursing note reviewed.   Constitutional:       Comments: Sedated, full ventilator support, failed SAT with severe agitation this morning   HENT:      Head: Normocephalic.      Nose: Nose normal.      Mouth/Throat:      Mouth: Mucous membranes are dry.   Eyes:      Pupils: Pupils are equal, round, and reactive to light.   Cardiovascular:      Rate and Rhythm: Regular rhythm. Tachycardia present.      Pulses: Normal pulses.   Pulmonary:      Comments: Breath sounds bilaterally, no wheezing, full vent support  Evidence of prior bilateral mastectomy and breast implants  Abdominal:      General: There is distension.      Palpations: Abdomen is soft.      Tenderness: There is no abdominal tenderness. There is no guarding.   Musculoskeletal:         General: No swelling or deformity.      Cervical back: Neck supple.   Skin:     General: Skin is dry.      Capillary Refill: Capillary refill takes less than 2 seconds.   Neurological:      Comments: Sedated, arouses, follows some limited commands          Medications  Current Facility-Administered Medications   Medication Dose Route Frequency Provider Last Rate Last Admin    octreotide (SandoSTATIN) 1,250 mcg in  mL Infusion  50 mcg/hr Intravenous Continuous Rick Ramos M.D. 10 mL/hr at 12/29/23 1800 50 mcg/hr at 12/29/23 1800    Respiratory Therapy Consult   Nebulization Continuous RT Rick Ramos M.D. MD Alert...ICU Electrolyte Replacement per Pharmacy   Other PHARMACY TO DOSE Rick Ramos M.D.        lidocaine (Xylocaine) 1 % injection 2 mL  2 mL Tracheal Tube Q30 MIN PRN Rick Ramos M.D.        fentaNYL (Sublimaze) injection 25 mcg  25 mcg Intravenous Q HOUR PRN Rick Ramos M.D.        Or    fentaNYL (Sublimaze) injection 50 mcg  50 mcg Intravenous Q HOUR PRN Rick Ramos M.D.        Or    fentaNYL (Sublimaze) injection 100 mcg  100 mcg Intravenous Q HOUR PRN Rick Ramos M.D.   100 mcg at 12/29/23 1945    fentaNYL (SUBLIMAZE) 50 mcg/mL in 50mL   Intravenous Continuous Rick Ramos M.D. 3 mL/hr at 12/30/23 0701 150 mcg/hr at 12/30/23 0701    ipratropium-albuterol (DUONEB) nebulizer solution  3 mL Nebulization Q2HRS PRN (RT) Rick Ramos M.D.        insulin regular (HumuLIN R,NovoLIN R) injection  1-6 Units Subcutaneous Q6HRS Rick Ramos M.D.   1 Units at 12/30/23 0533    And    dextrose 10 % BOLUS 25 g  25 g Intravenous Q15 MIN PRN Rick Ramos M.D.        norepinephrine (Levophed) 8 mg in 250 mL NS infusion (premix)  0-1 mcg/kg/min Intravenous Continuous Rick Ramos M.D. 19.6 mL/hr at 12/30/23 0603 0.17 mcg/kg/min at 12/30/23 0603    dexmedetomidine (PRECEDEX) 400 mcg/100mL NS premix infusion  0-1.5 mcg/kg/hr Intravenous Continuous Rick Ramos M.D. 9.2 mL/hr at 12/30/23 0646 0.6 mcg/kg/hr at 12/30/23 0646    cefTRIAXone (Rocephin) syringe 1,000 mg  1,000 mg Intravenous Q24HRS Rick Ramos M.D.        metroNIDAZOLE (Flagyl) IVPB 500 mg  500 mg Intravenous Q12HRS Rick Ramos M.D.   Stopped at  12/30/23 0629    thiamine (B-1) 200 mg in dextrose 5% 100 mL IVPB  200 mg Intravenous Q12HRS Rick Ramos M.D. 200 mL/hr at 12/30/23 0650 200 mg at 12/30/23 0650    pantoprazole (Protonix) injection 40 mg  40 mg Intravenous BID Rick Ramos M.D.   40 mg at 12/30/23 0528    riFAXIMin (Xifaxan) tablet 550 mg  550 mg Enteral Tube BID Rick Ramos M.D.        vasopressin (Vasostrict) 20 Units in  mL Infusion  0.03 Units/min Intravenous Continuous Trish Finn M.D.   Stopped at 12/30/23 0308       Fluids    Intake/Output Summary (Last 24 hours) at 12/30/2023 0742  Last data filed at 12/30/2023 0613  Gross per 24 hour   Intake 3086.73 ml   Output 1065 ml   Net 2021.73 ml       Laboratory  Recent Labs     12/29/23  1144 12/29/23  1504 12/30/23  0418   ISTATAPH 6.732* 7.248* 7.538*   ISTATAPCO2 26.5 22.7* 18.6*   ISTATAPO2 335* 473* 170*   ISTATATCO2 <10* 11* 16*   OPBDJXX8CQO 100* 100* 100*   ISTATARTHCO3 3.5* 9.9* 15.8*   ISTATARTBE -28* -16* -6*   ISTATTEMP 95.2 F 94.3 F 99.3 F   ISTATFIO2 100 100 40   ISTATSPEC Arterial Arterial Arterial   ISTATAPHTC 6.752* 7.281* 7.532*   [id number] 326* 458* 172*     Recent Labs     12/29/23  1500   CPKTOTAL 116     Recent Labs     12/29/23  1154 12/29/23  1500 12/29/23  2135 12/30/23  0111 12/30/23  0526   SODIUM 136   < > 135 136 137   POTASSIUM 5.4   < > 4.5 4.4 4.3   CHLORIDE 98   < > 100 101 104   CO2 5*   < > 15* 16* 18*   BUN 50*   < > 58* 56* 57*   CREATININE 1.93*   < > 1.87* 1.94* 1.91*   MAGNESIUM 3.1*  --   --   --  2.3   PHOSPHORUS 9.9*  --   --   --  4.1   CALCIUM 9.0   < > 8.2* 8.0* 7.6*    < > = values in this interval not displayed.     Recent Labs     12/29/23  1154 12/29/23  1500 12/29/23  1722 12/29/23  2135 12/30/23  0111 12/30/23  0526   ALTSGPT 639* 720*  --   --   --  985*   ASTSGOT 1719* 2399*  --   --   --  2766*   ALKPHOSPHAT 163* 279*  --   --   --  264*   TBILIRUBIN 0.5 1.1  --   --   --  1.2   LIPASE 44  --   --   --   --   --     GLUCOSE 141* 141*   < > 369* 272* 183*    < > = values in this interval not displayed.     Recent Labs     12/29/23  1154 12/29/23  1500 12/29/23  1722 12/29/23 2135 12/30/23  0111 12/30/23  0526   WBC 43.1* 25.1*   < > 29.9* 34.0* 37.2*   NEUTSPOLYS 57.40  --   --   --   --  74.10*   LYMPHOCYTES 14.90*  --   --   --   --  8.60*   MONOCYTES 7.20  --   --   --   --  0.90   EOSINOPHILS 3.90  --   --   --   --  0.00   BASOPHILS 0.10  --   --   --   --  0.90   ASTSGOT 1719* 2399*  --   --   --  2766*   ALTSGPT 639* 720*  --   --   --  985*   ALKPHOSPHAT 163* 279*  --   --   --  264*   TBILIRUBIN 0.5 1.1  --   --   --  1.2    < > = values in this interval not displayed.     Recent Labs     12/29/23  1049 12/29/23  1154 12/29/23  1500 12/29/23  1722 12/29/23 2135 12/30/23  0111 12/30/23  0526   RBC  --    < > 3.28*   < > 3.19* 3.20* 3.04*   HEMOGLOBIN  --    < > 8.5*   < > 8.3* 8.2* 7.7*   HEMATOCRIT  --    < > 27.6*   < > 23.8* 23.8* 22.8*   PLATELETCT  --    < > 233   < > 183 174 160*   PROTHROMBTM 23.2*  --  21.3*  --   --   --   --    APTT 26.2  --   --   --   --   --   --    INR 2.03*  --  1.82*  --   --   --   --     < > = values in this interval not displayed.       Imaging  X-Ray:  I have personally reviewed the images and compared with prior images.  CT:    Reviewed    CT C/A/P 12/29  1.  Large lobulated mass in the left upper quadrant communicates with the stomach contains multiple foci of air. The mass likely arises from the stomach and is consistent with malignancy  2.  Splenic infarction secondary to splenic artery and vein occlusion related to invasion of the left upper quadrant mass  3.  Multiple hypodense hepatic masses are consistent with metastases  4.  No acute intrathoracic findings  5.  Nonspecific 3 mm right upper lobe nodule. Follow-up per oncology protocol    EGD 12/29 -   OPERATIVE FINDINGS:  Gastric cancer.  One fungating ulcerated mass lesion around at least 6cm wide was noted at high body  post wall. Deep central ulceration with possible perforation or fistula formation. Active bleeding is noted, s/p biopsy and endoclot spray x 2.   Based on the endoscopic findings and CT scan from today, would recommend oncology, palliative/hospice consultation.   One 1.5cm clean based ulcer at GE junction.   Grossly normal duodenum.   RECOMMENDATIONS:  Supportive care.   Not amendable to endoscopic management when rebleeds.   GI signs off.     Assessment/Plan  * Hemorrhagic shock (HCC)- (present on admission)  Assessment & Plan  Secondary to necrotic tumor with ulceration  CT shows gastric mass with liver and mesenteric metastases  Hemoglobin initially 2.1 in ED with shock requiring vasopressor therapy  Massive transfusion protocol with full red box in ED  EGD 12/29 - fungating ulcerated mass 6cm with deep ulceration and ? Fistula/perf; endoclot and bx's taken  Protonix BID  Palliative care/symptom mgt    Lactic acidosis  Assessment & Plan  Secondary to profound anemia, hemorrhagic shock and multiple liver metastases  Improved    Acute respiratory failure with hypoxia (HCC)  Assessment & Plan  Intubated in ED 12/29 setting of hemorrhagic shock and altered mentation  Continue full ventilator support, not appropriate for SAT/SBT  Extubate today  DNR/I    Acute encephalopathy  Assessment & Plan  Suspect acute metabolic encephalopathy secondary to anemia, GI bleed and hemorrhagic shock  CT head unremarkable  Improving; fentanyl gtt stopped  Further workup including MRI brain if no improvement and concern for brain metastases    Acute kidney injury (HCC)  Assessment & Plan  Secondary to hypovolemia/hemorrhagic shock and profound anemia  Follow urine output, renal function, avoid nephrotoxins  Flores catheter placed    Severe anemia  Assessment & Plan  Initial hemoglobin 2.1 indicating component of chronic anemia  Massive transfusion protocol in ED  Continue to trend hemoglobin with transfusion as needed    History of  breast cancer- (present on admission)  Assessment & Plan  Bilateral mastectomies  ? 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  Request DNR/DNI, home hospice evaluation.  Patient remains slightly encephalopathic after just discontinuing fentanyl infusion.  Will consult palliative care/home hospice.  This is certainly appropriate in the setting of likely advanced/metastatic gastric tumor with liver and peritoneal metastases.  No endoscopic or surgical treatment options likely to provide any benefit.  He has a high chance of rebleeding.  She has been struggling with pain control for weeks and has been resistant to seeking any medical care.  Home hospice for pain control and symptom management likely best option at this point.      VTE:  Contraindicated  Ulcer: PPI  Lines: Central Line  Ongoing indication addressed, Arterial Line  Ongoing indication addressed, and Flores Catheter  Ongoing indication addressed    I have performed a physical exam and reviewed and updated ROS and Plan today (12/30/2023). In review of yesterday's note (12/29/2023), there are no changes except as documented above.     Discussed patient condition and risk of morbidity and/or mortality with RN, RT, Pharmacy, QA team, and SO Ike  The patient remains critically ill.  Critical care time = 45 minutes in  directly providing and coordinating critical care and extensive data review.  No time overlap and excludes procedures.

## 2023-12-30 NOTE — ASSESSMENT & PLAN NOTE
With mets to liver  Reviewed with Oncology who recommend Hospice; consult placed  Change to DNR/I per DW pt  Cont to support with blood products if needed to allow her time to sort out her affairs and arrange home hospice where she would prefer to be with her boyfriend and her dogs  Prn orders for MSO4 IV and oxycodone as well as po ativan and IV valium

## 2023-12-30 NOTE — ASSESSMENT & PLAN NOTE
Secondary to fungating gastric mass   S/p resuscitation vitals are now stable  Will cont to support pt while Hospice is arranged and to give her time to put her affiars in order

## 2023-12-31 PROBLEM — Z51.5 HOSPICE CARE: Status: ACTIVE | Noted: 2023-12-31

## 2023-12-31 PROBLEM — E43 SEVERE PROTEIN-CALORIE MALNUTRITION (HCC): Status: ACTIVE | Noted: 2023-12-31

## 2023-12-31 LAB
ANISOCYTOSIS BLD QL SMEAR: ABNORMAL
BASOPHILS # BLD AUTO: 0 % (ref 0–1.8)
BASOPHILS # BLD: 0 K/UL (ref 0–0.12)
DOHLE BOD BLD QL SMEAR: NORMAL
EOSINOPHIL # BLD AUTO: 2.55 K/UL (ref 0–0.51)
EOSINOPHIL NFR BLD: 6.2 % (ref 0–6.9)
ERYTHROCYTE [DISTWIDTH] IN BLOOD BY AUTOMATED COUNT: 64.7 FL (ref 35.9–50)
HCT VFR BLD AUTO: 25.3 % (ref 37–47)
HGB BLD-MCNC: 7.5 G/DL (ref 12–16)
HGB BLD-MCNC: 8.2 G/DL (ref 12–16)
HYPOCHROMIA BLD QL SMEAR: ABNORMAL
LYMPHOCYTES # BLD AUTO: 1.07 K/UL (ref 1–4.8)
LYMPHOCYTES NFR BLD: 2.6 % (ref 22–41)
MANUAL DIFF BLD: NORMAL
MCH RBC QN AUTO: 25.9 PG (ref 27–33)
MCHC RBC AUTO-ENTMCNC: 32.4 G/DL (ref 32.2–35.5)
MCV RBC AUTO: 79.8 FL (ref 81.4–97.8)
MICROCYTES BLD QL SMEAR: ABNORMAL
MONOCYTES # BLD AUTO: 1.81 K/UL (ref 0–0.85)
MONOCYTES NFR BLD AUTO: 4.4 % (ref 0–13.4)
MORPHOLOGY BLD-IMP: NORMAL
NEUTROPHILS # BLD AUTO: 35.67 K/UL (ref 1.82–7.42)
NEUTROPHILS NFR BLD: 83.3 % (ref 44–72)
NEUTS BAND NFR BLD MANUAL: 3.5 % (ref 0–10)
NRBC # BLD AUTO: 7.11 K/UL
NRBC BLD-RTO: 17.3 /100 WBC (ref 0–0.2)
PLATELET # BLD AUTO: 189 K/UL (ref 164–446)
PLATELET BLD QL SMEAR: NORMAL
PMV BLD AUTO: 10.5 FL (ref 9–12.9)
POLYCHROMASIA BLD QL SMEAR: NORMAL
RBC # BLD AUTO: 3.17 M/UL (ref 4.2–5.4)
RBC BLD AUTO: PRESENT
TOXIC GRANULES BLD QL SMEAR: NORMAL
WBC # BLD AUTO: 41.1 K/UL (ref 4.8–10.8)

## 2023-12-31 PROCEDURE — 700111 HCHG RX REV CODE 636 W/ 250 OVERRIDE (IP): Performed by: INTERNAL MEDICINE

## 2023-12-31 PROCEDURE — 85007 BL SMEAR W/DIFF WBC COUNT: CPT

## 2023-12-31 PROCEDURE — 700102 HCHG RX REV CODE 250 W/ 637 OVERRIDE(OP): Performed by: HOSPITALIST

## 2023-12-31 PROCEDURE — 770004 HCHG ROOM/CARE - ONCOLOGY PRIVATE *

## 2023-12-31 PROCEDURE — 700105 HCHG RX REV CODE 258: Performed by: INTERNAL MEDICINE

## 2023-12-31 PROCEDURE — 99418 PROLNG IP/OBS E/M EA 15 MIN: CPT | Performed by: STUDENT IN AN ORGANIZED HEALTH CARE EDUCATION/TRAINING PROGRAM

## 2023-12-31 PROCEDURE — C9113 INJ PANTOPRAZOLE SODIUM, VIA: HCPCS | Performed by: INTERNAL MEDICINE

## 2023-12-31 PROCEDURE — 99233 SBSQ HOSP IP/OBS HIGH 50: CPT | Performed by: STUDENT IN AN ORGANIZED HEALTH CARE EDUCATION/TRAINING PROGRAM

## 2023-12-31 PROCEDURE — 85018 HEMOGLOBIN: CPT

## 2023-12-31 PROCEDURE — 85027 COMPLETE CBC AUTOMATED: CPT

## 2023-12-31 PROCEDURE — A9270 NON-COVERED ITEM OR SERVICE: HCPCS | Performed by: HOSPITALIST

## 2023-12-31 PROCEDURE — 700105 HCHG RX REV CODE 258: Performed by: HOSPITALIST

## 2023-12-31 RX ADMIN — SODIUM CHLORIDE, POTASSIUM CHLORIDE, SODIUM LACTATE AND CALCIUM CHLORIDE: 600; 310; 30; 20 INJECTION, SOLUTION INTRAVENOUS at 17:04

## 2023-12-31 RX ADMIN — THIAMINE HYDROCHLORIDE 200 MG: 100 INJECTION, SOLUTION INTRAMUSCULAR; INTRAVENOUS at 04:45

## 2023-12-31 RX ADMIN — OXYCODONE 10 MG: 5 TABLET ORAL at 17:11

## 2023-12-31 RX ADMIN — THIAMINE HYDROCHLORIDE 200 MG: 100 INJECTION, SOLUTION INTRAMUSCULAR; INTRAVENOUS at 17:02

## 2023-12-31 RX ADMIN — PANTOPRAZOLE SODIUM 40 MG: 40 INJECTION, POWDER, FOR SOLUTION INTRAVENOUS at 17:02

## 2023-12-31 RX ADMIN — LORAZEPAM 1 MG: 1 TABLET ORAL at 10:50

## 2023-12-31 RX ADMIN — OXYCODONE 10 MG: 5 TABLET ORAL at 04:49

## 2023-12-31 RX ADMIN — OXYCODONE 10 MG: 5 TABLET ORAL at 12:53

## 2023-12-31 RX ADMIN — SODIUM CHLORIDE, POTASSIUM CHLORIDE, SODIUM LACTATE AND CALCIUM CHLORIDE: 600; 310; 30; 20 INJECTION, SOLUTION INTRAVENOUS at 04:36

## 2023-12-31 RX ADMIN — PANTOPRAZOLE SODIUM 40 MG: 40 INJECTION, POWDER, FOR SOLUTION INTRAVENOUS at 04:38

## 2023-12-31 ASSESSMENT — PAIN DESCRIPTION - PAIN TYPE
TYPE: ACUTE PAIN

## 2023-12-31 ASSESSMENT — ENCOUNTER SYMPTOMS
WEIGHT LOSS: 1
ABDOMINAL PAIN: 0
VOMITING: 0
NAUSEA: 0

## 2023-12-31 NOTE — ASSESSMENT & PLAN NOTE
Severe malnutrition in the context of chronic illness  Dietitian consulted  Monitor weight, prevent wt loss   Monitor electrolytes to assess risk for refeeding syndrome,

## 2023-12-31 NOTE — CONSULTS
Consult Note: Hematology/Oncology    Date of consultation: 12/31/2023 10:17 AM    Referring provider: Dr Chilel    Reason for consultation: Gastric mass with liver metastasis,  hemorrhagic shock      History of presenting illness:     63-year-old woman with history of breast cancer status post bilateral mastectomies many years ago, she was treated by Dr. Lafleur.  Also history of alcohol abuse per notes, patient denies this issue.  Presented with significant weakness, melena and abdominal pain, she was found to have a hemoglobin of 2.1 and hypotension, she was given blood transfusions.  She underwent for an EGD which showed a fungating ulcerated mass in the stomach  most likely related to gastric cancer.    A CT CAP showed a large lobulated mass in the left upper quadrant communicates with the stomach containing multiple foci of air, splenic infarction secondary to splenic artery and vein occlusion related to invasion of the left upper quadrant mass, multiple hypodense hepatic masses consistent with metastatic disease,.  On most recent CT abdomen showed multiple small bilateral renal infarcts, creatinine stable 1.8, she was found to have significant transaminitis related to liver metastasis and hemorrhagic shock with AST 2766, , getting worse, alkaline phosphatase 264, total bilirubin 1.2.  Her hemoglobin now stable at 7.5, WBC 41.7.  Biopsy results pending.  She was transferred to oncology thomas.,  She lives with boyfriend and dogs, she has not seek medical attention for years, she is weak, fatigued, debilitated.        Past Medical History:    Past Medical History:   Diagnosis Date    Blood transfusion     Cancer (HCC)     breast ca    Depression     MEDICAL HOME     Migraine     Thyroid activity decreased        Past surgical history:    Past Surgical History:   Procedure Laterality Date    ABDOMINAL HYSTERECTOMY TOTAL      BREAST BIOPSY      BREAST RECONSTRUCTION      TONSILLECTOMY         Allergies:   Penicillins and Sulfa drugs    Medications:    Current Facility-Administered Medications   Medication Dose Route Frequency Provider Last Rate Last Admin    oxyCODONE immediate-release (Roxicodone) tablet 5-10 mg  5-10 mg Oral Q4HRS PRN Leland Garcia, D.O.   10 mg at 12/31/23 0449    morphine 4 MG/ML injection 2-4 mg  2-4 mg Intravenous Q2HRS PRN Leland Garcia D.O.        LORazepam (Ativan) tablet 1 mg  1 mg Oral Q4HRS PRN Leland Garcia, D.O.   1 mg at 12/30/23 2051    lactated ringers infusion   Intravenous Continuous Leland Garcia D.O. 100 mL/hr at 12/31/23 0436 New Bag at 12/31/23 0436    Respiratory Therapy Consult   Nebulization Continuous RT Rick Ramos M.D.        ipratropium-albuterol (DUONEB) nebulizer solution  3 mL Nebulization Q2HRS PRN (RT) Rick Ramos M.D.        dextrose 10 % BOLUS 25 g  25 g Intravenous Q15 MIN PRN Rick Ramos M.D.        thiamine (B-1) 200 mg in dextrose 5% 100 mL IVPB  200 mg Intravenous Q12HRS Rick Ramos M.D. 200 mL/hr at 12/31/23 0445 200 mg at 12/31/23 0445    pantoprazole (Protonix) injection 40 mg  40 mg Intravenous BID Rick Ramos M.D.   40 mg at 12/31/23 0438       Social History:     Social History     Socioeconomic History    Marital status: Single     Spouse name: Not on file    Number of children: Not on file    Years of education: Not on file    Highest education level: Not on file   Occupational History    Not on file   Tobacco Use    Smoking status: Never    Smokeless tobacco: Not on file    Tobacco comments:     Exposed to second hand smoke   Substance and Sexual Activity    Alcohol use: Yes     Comment: occasionally    Drug use: No    Sexual activity: Not on file   Other Topics Concern    Not on file   Social History Narrative    Not on file     Social Determinants of Health     Financial Resource Strain: Not on file   Food Insecurity: Not on file   Transportation Needs: Not on file   Physical Activity: Not  "on file   Stress: Not on file   Social Connections: Not on file   Intimate Partner Violence: Not on file   Housing Stability: Not on file       Family History:     Family History   Problem Relation Age of Onset    Diabetes Mother     Cancer Mother     Hypertension Father     Heart Disease Father        Review of Systems:  All other review of systems are negative except what was mentioned above in the HPI.    Constitutional fatigue, malaise  HEENT: No new auditory or visual complaints. No sore throat and neck pain.     Respiratory:No new cough, sputum production, shortness of breath and wheezing.    Cardiovascular: No new chest pain, palpitations, orthopnea and leg swelling.    Gastrointestinal: Abdominal pain, abdominal discomfort  Genitourinary: Negative for dysuria, hematuria    Musculoskeletal: Fatigue  Skin: Negative for rash and itching.    Neurological: Negative for focal weakness or headaches.    Endo/Heme/Allergies: No abnormal bleed/bruise.    Psychiatric/Behavioral: No new depression/anxiety.    Physical Exam:   Vitals:   BP (!) 143/78 Comment: RN notified  Pulse 98   Temp 37.7 °C (99.8 °F) (Temporal)   Resp 18   Ht 1.727 m (5' 8\")   Wt 61.5 kg (135 lb 9.3 oz)   SpO2 96%   BMI 20.62 kg/m²     General: Not in acute distress, alert and oriented x 3  HEENT: Pallor  , no icterus. Oropharynx clear.   Neck: Supple, no palpable masses.Lymph nodes:   CVS: regular rate and rhythm, no rubs or gallops  RESP: Clear to auscultate bilaterally, no wheezing or crackles.   ABD: Tender on palpation, distended  EXT: Mild edema edema of LE B/L or cyanosis  CNS: Alert and oriented x3, No focal deficits.  Skin- No rash      Labs:   Recent Labs     12/29/23  1049 12/29/23  1154 12/29/23  1500 12/29/23  1722 12/30/23  0111 12/30/23  0526 12/30/23  0918 12/31/23  0406   RBC  --    < > 3.28*   < > 3.20* 3.04* 3.12*  --    HEMOGLOBIN  --    < > 8.5*   < > 8.2* 7.7* 8.1* 7.5*   HEMATOCRIT  --    < > 27.6*   < > 23.8* 22.8* " 23.8*  --    PLATELETCT  --    < > 233   < > 174 160* 178  --    PROTHROMBTM 23.2*  --  21.3*  --   --   --   --   --    APTT 26.2  --   --   --   --   --   --   --    INR 2.03*  --  1.82*  --   --   --   --   --     < > = values in this interval not displayed.     Lab Results   Component Value Date/Time    SODIUM 137 12/30/2023 09:18 AM    POTASSIUM 4.3 12/30/2023 09:18 AM    CHLORIDE 106 12/30/2023 09:18 AM    CO2 18 (L) 12/30/2023 09:18 AM    GLUCOSE 145 (H) 12/30/2023 09:18 AM    BUN 55 (H) 12/30/2023 09:18 AM    CREATININE 1.80 (H) 12/30/2023 09:18 AM        Assessment and Plan:  1.  Significant gastric mass, liver metastasis   -12/29 EGD fungating ulcerated mass of at least 6 cm in stomach, active bleeding, status post Endo clot spray x 2.  Biopsy pending    2.  Metastatic liver disease    3.  Hemorrhagic shock due to hemorrhagic mass in stomach  Status post blood transfusions-    4.  Shock liver    5.  Multiple infarcts in kidneys, infarct in spleen due to mass effect    6.  RIVAS.     7.  Leukocytosis reactive        Plan  -She was found to have a significant gastric mass with significant liver metastasis, biopsy pending but likely related to metastatic gastric cancer, in any event she is not a candidate for systemic therapy given advanced disease and poor ECOG performance status, she also has a shock liver which is getting worse, also has acute kidney disease with infarcts and spleen and kidneys.  -I did have a long discussion with the patient, discussed for around 25 mins, her prognosis is poor.  She will benefit from comfort care/hospice, consult in place.  Patient stated wants to think about it.     I will sign off, please call back if questions or concerns    Recommendations given to Dr Chilel.     She agreed and verbalized her agreement and understanding with the current plan.  I answered all questions and concerns she has at this time.              Thank you for allowing me to participate in her  care.    Please note that this dictation was created using voice recognition software. I have made every reasonable attempt to correct obvious errors, but I expect that there are errors of grammar and possibly content that I did not discover before finalizing the note.      SIGNATURES:  Can Purcell III, M.D.    CC:  Samuel French M.D.

## 2023-12-31 NOTE — DISCHARGE PLANNING
"Care Transition Team Discharge Planning                   Discharge Plan:  Pending IDT recommendations and clinical course    Pt has  a referral/order for Hospice.    Pt has no Insurance on file. This RN CM requested [facility] to screen Pt for Insurance.    Per last SW notes, Pt only has Ike Munson for support and emergency contact and that   Pt 's parents  and two brothers were  and Pt has no children. \"  "

## 2023-12-31 NOTE — ASSESSMENT & PLAN NOTE
Leukocytosis stress-induced and ongoing malignancy  No concern of ongoing infection  Continue to monitor off antibiotic

## 2023-12-31 NOTE — PROGRESS NOTES
Assumed care of patient. Assessment performed. 2 RN skin check completed. Frame alarm on as patient is in low airloss bed. Medications and purpose explained to patient. Patient medicated as per MAR for both reports of increasing anxiety and for pain. Patient oriented to call light, which was placed within reach. All needs met at this time.     IJ dressing changed

## 2023-12-31 NOTE — PROGRESS NOTES
4 Eyes Skin Assessment Completed by ROB Gamboa and ROB Farrell.    Head WDL  Ears WDL  Nose WDL  Mouth WDL  Neck WDL  Breast/Chest Bilateral mastectomy   Shoulder Blades WDL  Spine WDL  (R) Arm/Elbow/Hand Redness and Blanching  (L) Arm/Elbow/Hand Redness and Blanching  Abdomen WDL  Groin WDL  Scrotum/Coccyx/Buttocks Redness and Blanching  (R) Leg Scar, Scab, and Abrasion  (L) Leg Scar, Scab, and Abrasion  (R) Heel/Foot/Toe WDL  (L) Heel/Foot/Toe WDL          Devices In Places Pulse Ox and SCD's      Interventions In Place Pillows    Possible Skin Injury No    Pictures Uploaded Into Epic N/A  Wound Consult Placed N/A  RN Wound Prevention Protocol Ordered No

## 2023-12-31 NOTE — ASSESSMENT & PLAN NOTE
She agreed hospice care  Case med assisting  Palliative has been consulted to assist further discussion including comfort care

## 2023-12-31 NOTE — PROGRESS NOTES
Paged Dr. Manley regarding the patient's blood pressures in the 70-90's. Orders for another 500cc bolus of LR and continuous fluids to be running at 100cc/hr post fluid-bolus. Pt has had 3 blood pressures in the 100's s/p the first 500cc bolus. [facility] RN Hang updated and Dr. Manley comfortable with the patient transferring to [facility]. PT A/Ox4 and given all of her belongings when leaving TICU, all of her questions were answered and I gave her a sprite for the transfer to [facility]NU at her request.

## 2023-12-31 NOTE — PROGRESS NOTES
Hospital Medicine Daily Progress Note    Date of Service  12/31/2023    Chief Complaint  Natalya Allen is a 63 y.o. female admitted 12/29/2023 with abdominal mass, hemorrhagic shock    Hospital Course    63 y.o. female who presented 12/29/2023 with a history of breast cancer s/p B mastectomies, ETOH abuse, chronic back pain.  She quit drinking 2 months ago.  Presented with generalized wekness and melena with abdominal pain.  On presentation she was found to have an Hgb of 2.1 and was hypotensive.  Intubated and initiated on massive transfusion protocol.  She went for EGD on the same day with findings of a fungating ulcerated mass in the stomach consistent with gastric cancer.  Subsequent CT A/P showing a large gastric based mass.  Oncology evaluated and recommended comfort care for hospice as patient not candidate system therapy given advanced disease and poor performance status.    Interval Problem Update    12/31/2023    Vitals remained stable  Pain well-controlled  Patient is Aox3    Labs reviewed, noted significant leukocytosis, hemoglobin 7.5, chemistry noted with elevated BUN/creatinine which is improving    CT imaging reviewed, last 10/16 cm gastric tumor with liver and mesenteric metastases with splenic infarction and splenic artery occlusion; Erosion into the stomach with deep crater/fistula and bleeding .    S/p EGD was done 12/29 hemoglobin explained on ulcerated tumor.  Biopsy taken.  GI recommended hospice.    Extensive discussion regarding patient gastric tumor with liver metastasis and ongoing bleeding from stomach ulcer.  I explained her that she not a candidate for any systemic therapy, surgery given her advanced disease and poor pulmonary status.  I discussed regarding oncology and GI recommendation.  Patient like to think about comfort care today.  Agreed with hospice.   assisting.    Discussed plan with oncology Dr. Purcell in person.    Patient is high risk for deterioration into  hemorrhagic shock.  Need close monitoring.  I have consulted palliative and hospice team for further discussion.      I have discussed this patient's plan of care and discharge plan at IDT rounds today with Case Management, Nursing, Nursing leadership, and other members of the IDT team.    Consultants/Specialty  critical care, GI, and oncology    Code Status  DNAR/DNI    Disposition  The patient is not medically cleared for discharge to home or a post-acute facility.  Anticipate discharge to: hospice    I have placed the appropriate orders for post-discharge needs.    Review of Systems  Review of Systems   Constitutional:  Positive for malaise/fatigue and weight loss.   Gastrointestinal:  Negative for abdominal pain, nausea and vomiting.        Physical Exam  Temp:  [36.9 °C (98.4 °F)-37.7 °C (99.8 °F)] 37.7 °C (99.8 °F)  Pulse:  [] 98  Resp:  [14-32] 18  BP: ()/(51-96) 143/78  SpO2:  [81 %-100 %] 96 %    Physical Exam  Constitutional:       Appearance: She is ill-appearing and toxic-appearing.   Cardiovascular:      Rate and Rhythm: Normal rate and regular rhythm.      Pulses: Normal pulses.      Heart sounds: Normal heart sounds.   Pulmonary:      Effort: Pulmonary effort is normal.      Breath sounds: Normal breath sounds.   Abdominal:      General: There is distension.      Tenderness: There is abdominal tenderness.   Musculoskeletal:      Right lower leg: No edema.      Left lower leg: No edema.   Skin:     General: Skin is warm.   Neurological:      General: No focal deficit present.      Mental Status: She is oriented to person, place, and time.   Psychiatric:         Mood and Affect: Mood normal.         Fluids    Intake/Output Summary (Last 24 hours) at 12/31/2023 1131  Last data filed at 12/30/2023 1800  Gross per 24 hour   Intake 699.4 ml   Output 150 ml   Net 549.4 ml       Laboratory  Recent Labs     12/30/23  0111 12/30/23  0526 12/30/23  0918 12/31/23  0406   WBC 34.0* 37.2* 41.7*  --     RBC 3.20* 3.04* 3.12*  --    HEMOGLOBIN 8.2* 7.7* 8.1* 7.5*   HEMATOCRIT 23.8* 22.8* 23.8*  --    MCV 74.4* 75.0* 76.3*  --    MCH 25.6* 25.3* 26.0*  --    MCHC 34.5 33.8 34.0  --    RDW 55.3* 56.5* 57.9*  --    PLATELETCT 174 160* 178  --    MPV 9.4 9.5 10.1  --      Recent Labs     12/30/23  0111 12/30/23  0526 12/30/23  0918   SODIUM 136 137 137   POTASSIUM 4.4 4.3 4.3   CHLORIDE 101 104 106   CO2 16* 18* 18*   GLUCOSE 272* 183* 145*   BUN 56* 57* 55*   CREATININE 1.94* 1.91* 1.80*   CALCIUM 8.0* 7.6* 7.4*     Recent Labs     12/29/23  1049 12/29/23  1500   APTT 26.2  --    INR 2.03* 1.82*         Recent Labs     12/29/23  1154   TRIGLYCERIDE 83       Imaging  DX-CHEST-PORTABLE (1 VIEW)   Final Result      No significant interval change.      CT-ABDOMEN-PELVIS W/O   Final Result      1.  NG tube tip is in the distal stomach.   2.  Redemonstration of left upper quadrant mass, likely gastric in origin, detailed on the recent contrast-enhanced CT.   3.  Redemonstration of bilobar hepatic metastases.   4.  Findings of renal dysfunction wall multiple small bilateral renal infarcts.   5.  Spleen was better evaluated on the recent contrast-enhanced CT.   6.  Multiple retroperitoneal cheryl metastases.   7.  Trace hyperdense pelvic ascites.         DX-ABDOMEN FOR TUBE PLACEMENT   Final Result      Enteric tube tip projects over the distal stomach      CT-CHEST,ABDOMEN,PELVIS WITH   Final Result      1.  Large lobulated mass in the left upper quadrant communicates with the stomach contains multiple foci of air. The mass likely arises from the stomach and is consistent with malignancy      2.  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  Multiple hypodense hepatic masses are consistent with metastases      4.  No acute intrathoracic findings      5.  Nonspecific 3 mm right upper lobe nodule. Follow-up per oncology protocol      CT-HEAD W/O    Final Result      1.  No acute intracranial findings.      2.  Left mastoid effusion. Mucosal thickening or polyp in the right nare               DX-CHEST-PORTABLE (1 VIEW)   Final Result      1.  Endotracheal tube tip projects approximately 4.4 cm above the autumn      2.  Right internal jugular catheter tip projects over the cavoatrial junction. No pneumothorax is identified           Assessment/Plan  * Hemorrhagic shock (HCC)- (present on admission)  Assessment & Plan  S/p resuscitation vitals are now stable  Will cont to support pt while Hospice is arranged and to give her time to put her affiars in order    Severe protein-calorie malnutrition (HCC)  Assessment & Plan   Severe malnutrition in the context of chronic illness  Dietitian consulted  Monitor weight, prevent wt loss   Monitor electrolytes to assess risk for refeeding syndrome,      Hospice care  Assessment & Plan  She agreed hospice care  Case med assisting  Palliative has been consulted to assist further discussion including comfort care    Gastric cancer (HCC)  Assessment & Plan  With mets to liver  Reviewed with Oncology who recommend Hospice; consult placed  Change to DNR/I per DW pt  Cont to support with blood products if needed to allow her time to sort out her affairs and arrange home hospice where she would prefer to be with her boyfriend and her dogs  Prn orders for MSO4 IV and oxycodone as well as po ativan and IV valium    Leucocytosis  Assessment & Plan  Leukocytosis stress-induced and ongoing malignancy  No concern of ongoing infection  Continue to monitor off antibiotic      Abnormal transaminases  Assessment & Plan  Secondary to liver mets noted on CT    Acute encephalopathy  Assessment & Plan  S/p resuscitation pt is now alert and oriented    Acute kidney injury (HCC)  Assessment & Plan  Creat stable around 1.7-1.8    Acute respiratory failure with hypoxia (HCC)  Assessment & Plan  Extubated 12/30  O2/RT protocols    History of breast  cancer- (present on admission)  Assessment & Plan  History of breast cancer         VTE prophylaxis: Not a candidate for chemical prophylaxis given ongoing bleeding    I have performed a physical exam and reviewed and updated ROS and Plan today (12/31/2023). In review of yesterday's note (12/30/2023), there are no changes except as documented above.         Greater than 65  minutes spent preparing to see patient (e.g. review of tests) obtaining and/or reviewing separately obtained history. Performing a medically appropriate examination and/ evaluation.  Counseling and educating the patient/family/caregiver.  Ordering medications, tests, or procedures.  Referring and communicating with other health care professionals.  Documenting clinical information in EPIC.  Independently interpreting results and communicating results to patient/family/caregiver.  Care coordination.

## 2023-12-31 NOTE — CARE PLAN
The patient is Watcher - Medium risk of patient condition declining or worsening    Shift Goals  Clinical Goals: Hemodynamic stability, pain control  Patient Goals: Pain control, rest  Family Goals: Not present    Progress made toward(s) clinical / shift goals:  pain control and anxiety addressed     Problem: Knowledge Deficit - Standard  Goal: Patient and family/care givers will demonstrate understanding of plan of care, disease process/condition, diagnostic tests and medications  Outcome: Progressing  Note: Patient will verbalize understanding of education provided throughout the shift on nursing activities.     Problem: Fall Risk  Goal: Patient will remain free from falls  Outcome: Progressing  Note: Patient will not have a fall this shift.      Problem: Psychosocial  Goal: Patient's level of anxiety will decrease  Outcome: Progressing  Note: Patient will verbalize reduced level of anxiety post medication administration for anxiety/      Patient is not progressing towards the following goals:

## 2024-01-01 LAB
ALBUMIN SERPL BCP-MCNC: 2.5 G/DL (ref 3.2–4.9)
ALBUMIN/GLOB SERPL: 0.9 G/DL
ALP SERPL-CCNC: 316 U/L (ref 30–99)
ALT SERPL-CCNC: 547 U/L (ref 2–50)
ANION GAP SERPL CALC-SCNC: 12 MMOL/L (ref 7–16)
ANISOCYTOSIS BLD QL SMEAR: ABNORMAL
AST SERPL-CCNC: 222 U/L (ref 12–45)
BASOPHILS # BLD AUTO: 0.8 % (ref 0–1.8)
BASOPHILS # BLD: 0.32 K/UL (ref 0–0.12)
BILIRUB SERPL-MCNC: 3.6 MG/DL (ref 0.1–1.5)
BUN SERPL-MCNC: 40 MG/DL (ref 8–22)
BURR CELLS BLD QL SMEAR: NORMAL
CALCIUM ALBUM COR SERPL-MCNC: 8.7 MG/DL (ref 8.5–10.5)
CALCIUM SERPL-MCNC: 7.5 MG/DL (ref 8.5–10.5)
CHLORIDE SERPL-SCNC: 105 MMOL/L (ref 96–112)
CO2 SERPL-SCNC: 20 MMOL/L (ref 20–33)
CREAT SERPL-MCNC: 1.04 MG/DL (ref 0.5–1.4)
EOSINOPHIL # BLD AUTO: 1.06 K/UL (ref 0–0.51)
EOSINOPHIL NFR BLD: 2.6 % (ref 0–6.9)
ERYTHROCYTE [DISTWIDTH] IN BLOOD BY AUTOMATED COUNT: 66.7 FL (ref 35.9–50)
GFR SERPLBLD CREATININE-BSD FMLA CKD-EPI: 61 ML/MIN/1.73 M 2
GLOBULIN SER CALC-MCNC: 2.7 G/DL (ref 1.9–3.5)
GLUCOSE SERPL-MCNC: 134 MG/DL (ref 65–99)
HCT VFR BLD AUTO: 26.2 % (ref 37–47)
HGB BLD-MCNC: 8.2 G/DL (ref 12–16)
HYPOCHROMIA BLD QL SMEAR: ABNORMAL
LYMPHOCYTES # BLD AUTO: 3.13 K/UL (ref 1–4.8)
LYMPHOCYTES NFR BLD: 7.7 % (ref 22–41)
MACROCYTES BLD QL SMEAR: ABNORMAL
MANUAL DIFF BLD: NORMAL
MCH RBC QN AUTO: 25.2 PG (ref 27–33)
MCHC RBC AUTO-ENTMCNC: 31.3 G/DL (ref 32.2–35.5)
MCV RBC AUTO: 80.6 FL (ref 81.4–97.8)
MICROCYTES BLD QL SMEAR: ABNORMAL
MONOCYTES # BLD AUTO: 1.38 K/UL (ref 0–0.85)
MONOCYTES NFR BLD AUTO: 3.4 % (ref 0–13.4)
MORPHOLOGY BLD-IMP: NORMAL
MYELOCYTES NFR BLD MANUAL: 0.9 %
NEUTROPHILS # BLD AUTO: 33.98 K/UL (ref 1.82–7.42)
NEUTROPHILS NFR BLD: 82.9 % (ref 44–72)
NEUTS BAND NFR BLD MANUAL: 0.8 % (ref 0–10)
NRBC # BLD AUTO: 5.26 K/UL
NRBC BLD-RTO: 12.9 /100 WBC (ref 0–0.2)
PLATELET # BLD AUTO: 177 K/UL (ref 164–446)
PLATELET BLD QL SMEAR: NORMAL
PMV BLD AUTO: 10.1 FL (ref 9–12.9)
POIKILOCYTOSIS BLD QL SMEAR: NORMAL
POLYCHROMASIA BLD QL SMEAR: NORMAL
POTASSIUM SERPL-SCNC: 4.2 MMOL/L (ref 3.6–5.5)
PROMYELOCYTES NFR BLD MANUAL: 0.9 %
PROT SERPL-MCNC: 5.2 G/DL (ref 6–8.2)
RBC # BLD AUTO: 3.25 M/UL (ref 4.2–5.4)
RBC BLD AUTO: PRESENT
SCHISTOCYTES BLD QL SMEAR: NORMAL
SODIUM SERPL-SCNC: 137 MMOL/L (ref 135–145)
TARGETS BLD QL SMEAR: NORMAL
WBC # BLD AUTO: 40.6 K/UL (ref 4.8–10.8)

## 2024-01-01 PROCEDURE — 99233 SBSQ HOSP IP/OBS HIGH 50: CPT | Performed by: STUDENT IN AN ORGANIZED HEALTH CARE EDUCATION/TRAINING PROGRAM

## 2024-01-01 PROCEDURE — 97166 OT EVAL MOD COMPLEX 45 MIN: CPT

## 2024-01-01 PROCEDURE — 51798 US URINE CAPACITY MEASURE: CPT

## 2024-01-01 PROCEDURE — 97163 PT EVAL HIGH COMPLEX 45 MIN: CPT

## 2024-01-01 PROCEDURE — 700105 HCHG RX REV CODE 258: Performed by: INTERNAL MEDICINE

## 2024-01-01 PROCEDURE — 85027 COMPLETE CBC AUTOMATED: CPT

## 2024-01-01 PROCEDURE — 700111 HCHG RX REV CODE 636 W/ 250 OVERRIDE (IP): Performed by: INTERNAL MEDICINE

## 2024-01-01 PROCEDURE — 97535 SELF CARE MNGMENT TRAINING: CPT

## 2024-01-01 PROCEDURE — 85007 BL SMEAR W/DIFF WBC COUNT: CPT

## 2024-01-01 PROCEDURE — C9113 INJ PANTOPRAZOLE SODIUM, VIA: HCPCS | Performed by: INTERNAL MEDICINE

## 2024-01-01 PROCEDURE — 700105 HCHG RX REV CODE 258: Performed by: HOSPITALIST

## 2024-01-01 PROCEDURE — A9270 NON-COVERED ITEM OR SERVICE: HCPCS | Performed by: HOSPITALIST

## 2024-01-01 PROCEDURE — 770004 HCHG ROOM/CARE - ONCOLOGY PRIVATE *

## 2024-01-01 PROCEDURE — 80053 COMPREHEN METABOLIC PANEL: CPT

## 2024-01-01 PROCEDURE — 700102 HCHG RX REV CODE 250 W/ 637 OVERRIDE(OP): Performed by: HOSPITALIST

## 2024-01-01 RX ADMIN — THIAMINE HYDROCHLORIDE 200 MG: 100 INJECTION, SOLUTION INTRAMUSCULAR; INTRAVENOUS at 16:40

## 2024-01-01 RX ADMIN — SODIUM CHLORIDE, POTASSIUM CHLORIDE, SODIUM LACTATE AND CALCIUM CHLORIDE: 600; 310; 30; 20 INJECTION, SOLUTION INTRAVENOUS at 03:18

## 2024-01-01 RX ADMIN — OXYCODONE 10 MG: 5 TABLET ORAL at 20:46

## 2024-01-01 RX ADMIN — LORAZEPAM 1 MG: 1 TABLET ORAL at 13:29

## 2024-01-01 RX ADMIN — PANTOPRAZOLE SODIUM 40 MG: 40 INJECTION, POWDER, FOR SOLUTION INTRAVENOUS at 16:40

## 2024-01-01 RX ADMIN — OXYCODONE 10 MG: 5 TABLET ORAL at 16:45

## 2024-01-01 RX ADMIN — PANTOPRAZOLE SODIUM 40 MG: 40 INJECTION, POWDER, FOR SOLUTION INTRAVENOUS at 05:44

## 2024-01-01 RX ADMIN — THIAMINE HYDROCHLORIDE 200 MG: 100 INJECTION, SOLUTION INTRAMUSCULAR; INTRAVENOUS at 05:45

## 2024-01-01 RX ADMIN — SODIUM CHLORIDE, POTASSIUM CHLORIDE, SODIUM LACTATE AND CALCIUM CHLORIDE: 600; 310; 30; 20 INJECTION, SOLUTION INTRAVENOUS at 13:26

## 2024-01-01 RX ADMIN — OXYCODONE 10 MG: 5 TABLET ORAL at 00:46

## 2024-01-01 ASSESSMENT — PAIN DESCRIPTION - PAIN TYPE
TYPE: ACUTE PAIN

## 2024-01-01 ASSESSMENT — COGNITIVE AND FUNCTIONAL STATUS - GENERAL
WALKING IN HOSPITAL ROOM: A LOT
CLIMB 3 TO 5 STEPS WITH RAILING: TOTAL
TURNING FROM BACK TO SIDE WHILE IN FLAT BAD: UNABLE
PERSONAL GROOMING: A LITTLE
DRESSING REGULAR LOWER BODY CLOTHING: A LOT
TOILETING: A LOT
SUGGESTED CMS G CODE MODIFIER MOBILITY: CM
HELP NEEDED FOR BATHING: A LOT
MOVING TO AND FROM BED TO CHAIR: UNABLE
MOVING FROM LYING ON BACK TO SITTING ON SIDE OF FLAT BED: UNABLE
SUGGESTED CMS G CODE MODIFIER DAILY ACTIVITY: CK
EATING MEALS: A LITTLE
DAILY ACTIVITIY SCORE: 15
MOBILITY SCORE: 8
STANDING UP FROM CHAIR USING ARMS: A LOT
DRESSING REGULAR UPPER BODY CLOTHING: A LITTLE

## 2024-01-01 ASSESSMENT — ENCOUNTER SYMPTOMS
NAUSEA: 0
VOMITING: 0
WEIGHT LOSS: 1
ABDOMINAL PAIN: 0

## 2024-01-01 ASSESSMENT — GAIT ASSESSMENTS: GAIT LEVEL OF ASSIST: UNABLE TO PARTICIPATE

## 2024-01-01 ASSESSMENT — ACTIVITIES OF DAILY LIVING (ADL): TOILETING: INDEPENDENT

## 2024-01-01 NOTE — PROGRESS NOTES
Patient is A&Ox4, forgetful. No complaints of pain. Plan of care discussed with the patient, all concerns addressed. Call light is within reach and patient educated on fall precautions, verbalized and demonstrated understanding. Bed in lowest and locked position. Hourly rounding in place.

## 2024-01-01 NOTE — PROGRESS NOTES
Hospital Medicine Daily Progress Note    Date of Service  1/1/2024    Chief Complaint  Natalya Allen is a 63 y.o. female admitted 12/29/2023 with abdominal mass, hemorrhagic shock    Hospital Course    63 y.o. female who presented 12/29/2023 with a history of breast cancer s/p B mastectomies, ETOH abuse, chronic back pain.  She quit drinking 2 months ago.  Presented with generalized wekness and melena with abdominal pain.  On presentation she was found to have an Hgb of 2.1 and was hypotensive.  Intubated and initiated on massive transfusion protocol.  She went for EGD on the same day with findings of a fungating ulcerated mass in the stomach consistent with gastric cancer.  Subsequent CT A/P showing a large gastric based mass.  S/p EGD was done 12/29 hemoglobin explained on ulcerated tumor.  Biopsy taken.  GI recommended hospice.Oncology evaluated and recommended comfort care for hospice as patient not candidate systemic  therapy given advanced disease and poor performance status.    Interval Problem Update          1/1/2024  Vitals remained stable  Labs reviewed, leukocytosis improving white count 40.6, hemoglobin 8.2 remained stable, transaminitis significantly improved.      Monitor H&H closely  PT/OT evaluation  Palliative evaluation   assisting with discharge plan  Discussed with Lenin from hospice.  Patient lacks insurance.    Discussed regarding comfort care.  She  like to defer comfort care for now and like to continue checking blood while in hospital and get transfusion if needed.  She is agreeing for hospice.       Patient is high risk for deterioration into hemorrhagic shock.  Need close monitoring.  I have consulted palliative and hospice team for further discussion.      I have discussed this patient's plan of care and discharge plan at IDT rounds today with Case Management, Nursing, Nursing leadership, and other members of the IDT team.    Consultants/Specialty  critical care, GI, and  oncology    Code Status  DNAR/DNI    Disposition  The patient is not medically cleared for discharge to home or a post-acute facility.  Anticipate discharge to: hospice    I have placed the appropriate orders for post-discharge needs.    Review of Systems  Review of Systems   Constitutional:  Positive for malaise/fatigue and weight loss.   Gastrointestinal:  Negative for abdominal pain, nausea and vomiting.        Physical Exam  Temp:  [36.6 °C (97.8 °F)-37.3 °C (99.1 °F)] 37.3 °C (99.1 °F)  Pulse:  [102-111] 102  Resp:  [18] 18  BP: (112-123)/(76-90) 115/76  SpO2:  [94 %-97 %] 97 %    Physical Exam  Constitutional:       Appearance: She is ill-appearing and toxic-appearing.   Cardiovascular:      Rate and Rhythm: Normal rate and regular rhythm.      Pulses: Normal pulses.      Heart sounds: Normal heart sounds.   Pulmonary:      Effort: Pulmonary effort is normal.      Breath sounds: Normal breath sounds.   Abdominal:      General: There is distension.      Tenderness: There is abdominal tenderness.   Musculoskeletal:      Right lower leg: No edema.      Left lower leg: No edema.   Skin:     General: Skin is warm.   Neurological:      General: No focal deficit present.      Mental Status: She is oriented to person, place, and time.   Psychiatric:         Mood and Affect: Mood normal.         Fluids    Intake/Output Summary (Last 24 hours) at 1/1/2024 1544  Last data filed at 1/1/2024 0650  Gross per 24 hour   Intake 50 ml   Output 100 ml   Net -50 ml         Laboratory  Recent Labs     12/30/23  0918 12/31/23  0406 12/31/23  1245 01/01/24  0040   WBC 41.7*  --  41.1* 40.6*   RBC 3.12*  --  3.17* 3.25*   HEMOGLOBIN 8.1* 7.5* 8.2* 8.2*   HEMATOCRIT 23.8*  --  25.3* 26.2*   MCV 76.3*  --  79.8* 80.6*   MCH 26.0*  --  25.9* 25.2*   MCHC 34.0  --  32.4 31.3*   RDW 57.9*  --  64.7* 66.7*   PLATELETCT 178  --  189 177   MPV 10.1  --  10.5 10.1       Recent Labs     12/30/23  0526 12/30/23  0918 01/01/24  0040   SODIUM 137  137 137   POTASSIUM 4.3 4.3 4.2   CHLORIDE 104 106 105   CO2 18* 18* 20   GLUCOSE 183* 145* 134*   BUN 57* 55* 40*   CREATININE 1.91* 1.80* 1.04   CALCIUM 7.6* 7.4* 7.5*                         Imaging  DX-CHEST-PORTABLE (1 VIEW)   Final Result      No significant interval change.      CT-ABDOMEN-PELVIS W/O   Final Result      1.  NG tube tip is in the distal stomach.   2.  Redemonstration of left upper quadrant mass, likely gastric in origin, detailed on the recent contrast-enhanced CT.   3.  Redemonstration of bilobar hepatic metastases.   4.  Findings of renal dysfunction wall multiple small bilateral renal infarcts.   5.  Spleen was better evaluated on the recent contrast-enhanced CT.   6.  Multiple retroperitoneal cheryl metastases.   7.  Trace hyperdense pelvic ascites.         DX-ABDOMEN FOR TUBE PLACEMENT   Final Result      Enteric tube tip projects over the distal stomach      CT-CHEST,ABDOMEN,PELVIS WITH   Final Result      1.  Large lobulated mass in the left upper quadrant communicates with the stomach contains multiple foci of air. The mass likely arises from the stomach and is consistent with malignancy      2.  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  Multiple hypodense hepatic masses are consistent with metastases      4.  No acute intrathoracic findings      5.  Nonspecific 3 mm right upper lobe nodule. Follow-up per oncology protocol      CT-HEAD W/O   Final Result      1.  No acute intracranial findings.      2.  Left mastoid effusion. Mucosal thickening or polyp in the right nare               DX-CHEST-PORTABLE (1 VIEW)   Final Result      1.  Endotracheal tube tip projects approximately 4.4 cm above the autumn      2.  Right internal jugular catheter tip projects over the cavoatrial junction. No pneumothorax is identified           Assessment/Plan  * Hemorrhagic shock (HCC)- (present on admission)  Assessment &  Plan  S/p resuscitation vitals are now stable  Will cont to support pt while Hospice is arranged and to give her time to put her affiars in order    Severe protein-calorie malnutrition (HCC)  Assessment & Plan   Severe malnutrition in the context of chronic illness  Dietitian consulted  Monitor weight, prevent wt loss   Monitor electrolytes to assess risk for refeeding syndrome,      Hospice care  Assessment & Plan  She agreed hospice care  Case med assisting  Palliative has been consulted to assist further discussion including comfort care    Gastric cancer (HCC)  Assessment & Plan  With mets to liver  Reviewed with Oncology who recommend Hospice; consult placed  Change to DNR/I per DW pt  Cont to support with blood products if needed to allow her time to sort out her affairs and arrange home hospice where she would prefer to be with her boyfriend and her dogs  Prn orders for MSO4 IV and oxycodone as well as po ativan and IV valium    Leucocytosis  Assessment & Plan  Leukocytosis stress-induced and ongoing malignancy  No concern of ongoing infection  Continue to monitor off antibiotic      Abnormal transaminases  Assessment & Plan  Secondary to liver mets noted on CT    Acute encephalopathy  Assessment & Plan  S/p resuscitation pt is now alert and oriented    Acute kidney injury (HCC)  Assessment & Plan  Creat stable around 1.7-1.8    Acute respiratory failure with hypoxia (HCC)  Assessment & Plan  Extubated 12/30  O2/RT protocols    History of breast cancer- (present on admission)  Assessment & Plan  History of breast cancer         VTE prophylaxis: Not a candidate for chemical prophylaxis given ongoing bleeding    I have performed a physical exam and reviewed and updated ROS and Plan today (1/1/2024). In review of yesterday's note (12/31/2023), there are no changes except as documented above.            Greater than 51 minutes spent preparing to see patient (e.g. review of tests) obtaining and/or reviewing  separately obtained history. Performing a medically appropriate examination and/ evaluation.  Counseling and educating the patient/family/caregiver.  Ordering medications, tests, or procedures.  Referring and communicating with other health care professionals.  Documenting clinical information in EPIC.  Independently interpreting results and communicating results to patient/family/caregiver.  Care coordination.

## 2024-01-01 NOTE — HOSPITAL COURSE
63 y.o. female who presented 12/29/2023 with a history of breast cancer s/p B mastectomies, ETOH abuse, chronic back pain.  She quit drinking 2 months ago.  Presented with generalized wekness and melena with abdominal pain.  On presentation she was found to have an Hgb of 2.1 and was hypotensive.  Intubated and initiated on massive transfusion protocol.  She went for EGD on the same day with findings of a fungating ulcerated mass in the stomach consistent with gastric cancer.  Subsequent CT A/P showing a large gastric based mass.  S/p EGD was done 12/29 hemoglobin explained on ulcerated tumor.  Biopsy taken.  GI recommended hospice.Oncology evaluated and recommended comfort care for hospice as patient not candidate system therapy given advanced disease and poor performance status.

## 2024-01-01 NOTE — CARE PLAN
The patient is Stable - Low risk of patient condition declining or worsening    Shift Goals  Clinical Goals: monitor for bleeding  Patient Goals: patient will have decreased anxiety overnight  Family Goals: NA    Progress made toward(s) clinical / shift goals:    Problem: Knowledge Deficit - Standard  Goal: Patient and family/care givers will demonstrate understanding of plan of care, disease process/condition, diagnostic tests and medications  Outcome: Progressing     Problem: Skin Integrity  Goal: Skin integrity is maintained or improved  Outcome: Progressing     Problem: Fall Risk  Goal: Patient will remain free from falls  Outcome: Progressing     Problem: Pain - Standard  Goal: Alleviation of pain or a reduction in pain to the patient’s comfort goal  Outcome: Progressing       Patient is not progressing towards the following goals:

## 2024-01-01 NOTE — DIETARY
"Nutrition services: Day 3 of admit.  Natalya Allen is a 63 y.o. female admitted abdominal mass, gastric cancer, hemorrhagic shock, malnutrition, acute kidney injury  History includes breast cancer, mastectomy, ETOH abuse, chronic back pain  Patient declined visit at this time and asked if I could come back at another time.    Assessment:  Height: 172.7 cm (5' 8\")  Weight: 61.5 kg (135 lb 9.3 oz)  Body mass index is 20.62 kg/m².  Diet/Intake: Regular    Evaluation:   Pertinent Labs: glucose 134, Bun 40  Patient has a referral for hospice    Malnutrition Risk: Not yet determined    Recommendations/Interventions/Plan:    RD will f/u to interview patient   Encourage intake of meals  Document intake of all PO as % taken in ADL's to provide interdisciplinary communication across all shifts.   Monitor weight.  Nutrition rep will continue to see patient for ongoing meal and snack preferences.     RD following    "

## 2024-01-02 ENCOUNTER — HOSPICE ADMISSION (OUTPATIENT)
Dept: HOSPICE | Facility: HOSPICE | Age: 64
End: 2024-01-02

## 2024-01-02 ENCOUNTER — HOME CARE VISIT (OUTPATIENT)
Dept: HOSPICE | Facility: HOSPICE | Age: 64
End: 2024-01-02

## 2024-01-02 LAB
ALBUMIN SERPL BCP-MCNC: 2.2 G/DL (ref 3.2–4.9)
ALBUMIN/GLOB SERPL: 0.9 G/DL
ALP SERPL-CCNC: 350 U/L (ref 30–99)
ALT SERPL-CCNC: 278 U/L (ref 2–50)
ANION GAP SERPL CALC-SCNC: 10 MMOL/L (ref 7–16)
ANISOCYTOSIS BLD QL SMEAR: ABNORMAL
AST SERPL-CCNC: 67 U/L (ref 12–45)
BASOPHILS # BLD AUTO: 0 % (ref 0–1.8)
BASOPHILS # BLD: 0 K/UL (ref 0–0.12)
BILIRUB SERPL-MCNC: 4.9 MG/DL (ref 0.1–1.5)
BUN SERPL-MCNC: 29 MG/DL (ref 8–22)
BURR CELLS BLD QL SMEAR: NORMAL
CALCIUM ALBUM COR SERPL-MCNC: 9.1 MG/DL (ref 8.5–10.5)
CALCIUM SERPL-MCNC: 7.7 MG/DL (ref 8.5–10.5)
CHLORIDE SERPL-SCNC: 106 MMOL/L (ref 96–112)
CO2 SERPL-SCNC: 21 MMOL/L (ref 20–33)
CREAT SERPL-MCNC: 0.71 MG/DL (ref 0.5–1.4)
EOSINOPHIL # BLD AUTO: 2.03 K/UL (ref 0–0.51)
EOSINOPHIL NFR BLD: 6 % (ref 0–6.9)
ERYTHROCYTE [DISTWIDTH] IN BLOOD BY AUTOMATED COUNT: 69.4 FL (ref 35.9–50)
GFR SERPLBLD CREATININE-BSD FMLA CKD-EPI: 96 ML/MIN/1.73 M 2
GLOBULIN SER CALC-MCNC: 2.4 G/DL (ref 1.9–3.5)
GLUCOSE SERPL-MCNC: 104 MG/DL (ref 65–99)
HCT VFR BLD AUTO: 24.7 % (ref 37–47)
HGB BLD-MCNC: 7.6 G/DL (ref 12–16)
HYPOCHROMIA BLD QL SMEAR: ABNORMAL
LYMPHOCYTES # BLD AUTO: 0.88 K/UL (ref 1–4.8)
LYMPHOCYTES NFR BLD: 2.6 % (ref 22–41)
MACROCYTES BLD QL SMEAR: ABNORMAL
MANUAL DIFF BLD: NORMAL
MCH RBC QN AUTO: 24.9 PG (ref 27–33)
MCHC RBC AUTO-ENTMCNC: 30.8 G/DL (ref 32.2–35.5)
MCV RBC AUTO: 81 FL (ref 81.4–97.8)
MICROCYTES BLD QL SMEAR: ABNORMAL
MONOCYTES # BLD AUTO: 1.15 K/UL (ref 0–0.85)
MONOCYTES NFR BLD AUTO: 3.4 % (ref 0–13.4)
MORPHOLOGY BLD-IMP: NORMAL
MYELOCYTES NFR BLD MANUAL: 1.7 %
NEUTROPHILS # BLD AUTO: 29.17 K/UL (ref 1.82–7.42)
NEUTROPHILS NFR BLD: 86.3 % (ref 44–72)
NRBC # BLD AUTO: 2.39 K/UL
NRBC BLD-RTO: 7.1 /100 WBC (ref 0–0.2)
PATHOLOGY CONSULT NOTE: NORMAL
PLATELET # BLD AUTO: 177 K/UL (ref 164–446)
PLATELET BLD QL SMEAR: NORMAL
PMV BLD AUTO: 11.1 FL (ref 9–12.9)
POIKILOCYTOSIS BLD QL SMEAR: NORMAL
POLYCHROMASIA BLD QL SMEAR: NORMAL
POTASSIUM SERPL-SCNC: 4.1 MMOL/L (ref 3.6–5.5)
PROT SERPL-MCNC: 4.6 G/DL (ref 6–8.2)
RBC # BLD AUTO: 3.05 M/UL (ref 4.2–5.4)
RBC BLD AUTO: PRESENT
SODIUM SERPL-SCNC: 137 MMOL/L (ref 135–145)
TARGETS BLD QL SMEAR: NORMAL
WBC # BLD AUTO: 33.8 K/UL (ref 4.8–10.8)

## 2024-01-02 PROCEDURE — C9113 INJ PANTOPRAZOLE SODIUM, VIA: HCPCS | Performed by: INTERNAL MEDICINE

## 2024-01-02 PROCEDURE — 85007 BL SMEAR W/DIFF WBC COUNT: CPT

## 2024-01-02 PROCEDURE — 80053 COMPREHEN METABOLIC PANEL: CPT

## 2024-01-02 PROCEDURE — 700105 HCHG RX REV CODE 258: Performed by: INTERNAL MEDICINE

## 2024-01-02 PROCEDURE — 770004 HCHG ROOM/CARE - ONCOLOGY PRIVATE *

## 2024-01-02 PROCEDURE — A9270 NON-COVERED ITEM OR SERVICE: HCPCS | Performed by: HOSPITALIST

## 2024-01-02 PROCEDURE — 85027 COMPLETE CBC AUTOMATED: CPT

## 2024-01-02 PROCEDURE — 700102 HCHG RX REV CODE 250 W/ 637 OVERRIDE(OP): Performed by: HOSPITALIST

## 2024-01-02 PROCEDURE — 700111 HCHG RX REV CODE 636 W/ 250 OVERRIDE (IP): Performed by: INTERNAL MEDICINE

## 2024-01-02 PROCEDURE — 99233 SBSQ HOSP IP/OBS HIGH 50: CPT | Performed by: STUDENT IN AN ORGANIZED HEALTH CARE EDUCATION/TRAINING PROGRAM

## 2024-01-02 RX ADMIN — LORAZEPAM 1 MG: 1 TABLET ORAL at 20:46

## 2024-01-02 RX ADMIN — THIAMINE HYDROCHLORIDE 200 MG: 100 INJECTION, SOLUTION INTRAMUSCULAR; INTRAVENOUS at 05:54

## 2024-01-02 RX ADMIN — LORAZEPAM 1 MG: 1 TABLET ORAL at 08:16

## 2024-01-02 RX ADMIN — OXYCODONE 5 MG: 5 TABLET ORAL at 16:11

## 2024-01-02 RX ADMIN — OXYCODONE 5 MG: 5 TABLET ORAL at 10:00

## 2024-01-02 RX ADMIN — PANTOPRAZOLE SODIUM 40 MG: 40 INJECTION, POWDER, FOR SOLUTION INTRAVENOUS at 05:54

## 2024-01-02 RX ADMIN — OXYCODONE 10 MG: 5 TABLET ORAL at 05:54

## 2024-01-02 RX ADMIN — PANTOPRAZOLE SODIUM 40 MG: 40 INJECTION, POWDER, FOR SOLUTION INTRAVENOUS at 16:15

## 2024-01-02 RX ADMIN — OXYCODONE 5 MG: 5 TABLET ORAL at 20:46

## 2024-01-02 ASSESSMENT — ENCOUNTER SYMPTOMS
ABDOMINAL PAIN: 1
NERVOUS/ANXIOUS: 1
WEIGHT LOSS: 1
VOMITING: 0
NAUSEA: 0
HALLUCINATIONS: 1

## 2024-01-02 ASSESSMENT — PAIN DESCRIPTION - PAIN TYPE
TYPE: ACUTE PAIN

## 2024-01-02 NOTE — DISCHARGE PLANNING
Care Transition Team Assessment    Completed chart review, pt lives alone with her boyfriend Ike. Pt had hospice order placed, pt does not have insurance. It is documented on 12/31, requested \[facility] [place] [facility]\"" to screen pt for Medicaid. Spoke with [facility], if pt would like hospice services, [facility] is able to provide pro alec services to pt. [facility] liaison attempted to speak with pt but not able to wake up for conversation, will discuss hospice with pt again tomorrow.       Information Source  Orientation Level: Oriented X4  Information Given By: Patient  Who is responsible for making decisions for patient? : Patient    Readmission Evaluation  Is this a readmission?: No    Elopement Risk  Legal Hold: No  Ambulatory or Self Mobile in Wheelchair: No-Not an Elopement Risk  Disoriented: No  Psychiatric Symptoms: None  History of Wandering: No  Elopement this Admit: No  Vocalizing Wanting to Leave: No  Displays Behaviors, Body Language Wanting to Leave: No-Not at Risk for Elopement  Elopement Risk: Not at Risk for Elopement    Interdisciplinary Discharge Planning  Lives with - Patient's Self Care Capacity: Significant Other  Housing / Facility: [address] [facility]  Prior Services: None    Discharge Preparedness  What is your plan after discharge?: Home with help  What are your discharge supports?: Partner  Prior Functional Level: Ambulatory, Independent with Activities of Daily Living, Independent with Medication Management  Difficulity with ADLs: None  Difficulity with IADLs: None    Functional Assesment  Prior Functional Level: Ambulatory, Independent with Activities of Daily Living, Independent with Medication Management    Finances  Financial Barriers to Discharge: Yes  Prescription Coverage: No              Advance Directive  Advance Directive?: None         Psychological Assessment  History of Substance Abuse: None  History of Psychiatric Problems: No  Non-compliant with Treatment: No  Newly Diagnosed Illness:  Yes    Discharge Risks or Barriers  Discharge risks or barriers?: Uninsured / underinsured, Medicaid pending, Complex medical needs  Patient risk factors: Complex medical needs, Medicaid pending    Anticipated Discharge Information  Discharge Disposition: D/T to hospice home (50)

## 2024-01-02 NOTE — CONSULTS
Palliative   EMR reviewed, patient agreeable to hospice.  As goals appear established will defer consult.  Thank you,      Felipa Mccormick, MSN, APRN, ACNPC-AG

## 2024-01-02 NOTE — CARE PLAN
Problem: Knowledge Deficit - Standard  Goal: Patient and family/care givers will demonstrate understanding of plan of care, disease process/condition, diagnostic tests and medications  Outcome: Progressing     Problem: Pain - Standard  Goal: Alleviation of pain or a reduction in pain to the patient’s comfort goal  Outcome: Progressing   The patient is Watcher - Medium risk of patient condition declining or worsening    Shift Goals  Clinical Goals: pain control  Patient Goals: rest and comfort  Family Goals: none present at this time    Progress made toward(s) clinical / shift goals:  pt updated on POC for today.  Medicated for pain.     Patient is not progressing towards the following goals:

## 2024-01-02 NOTE — PROGRESS NOTES
Pt is oriented x 4 with complaints of abdominal.  Flores in place for retention.  Call light within reach.

## 2024-01-02 NOTE — THERAPY
"Occupational Therapy   Initial Evaluation     Patient Name: Natalya Allen  Age:  63 y.o., Sex:  female  Medical Record #: [id number]  Today's Date: 1/1/2024       Precautions: Fall Risk  Comments: generalized weakness, knees buckle    Assessment  Patient is 63 y.o. female with a history of breast cancer status post bilateral mastectomies many years ago. Pt presented with significant weakness, melena and abdominal pain, she was found to have a hemoglobin of 2.1 and hypotension, she was given blood transfusions.  She underwent an EGD which showed a fungating ulcerated mass in the stomach most likely related to gastric cancer.  Pt presented with significant weakness in BLE.  Pt had delayed responses & often needed redirection to task at hand.  Pt also reported feeling overwhelmed with her current medical condition.  Pt was Mod A transfer to chair & her bilateral knees buckle.  Pt was Max A for LB dressing.  Pt reported her SO will help her at D/C.  Will need to clarify if he can assist her at this level.  Pt will need a BSC, W/C, hospital bed & tub transfer bench for home.      Plan    Occupational Therapy Initial Treatment Plan   Treatment Interventions: Self Care / Activities of Daily Living, Cognitive Skill Development, Neuro Re-Education / Balance, Therapeutic Exercises, Therapeutic Activity  Treatment Frequency: 3 Times per Week  Duration: Until Therapy Goals Met    DC Equipment Recommendations: Wheelchair, Tub Transfer Bench, Bed Side Commode, Hand Held Shower, Hospital Bed  Discharge Recommendations: Other - (pt is thinking of going home on Hospice)     Subjective    \"I'm going to need some time to process all this information\"     Objective      Initial Contact Note    Initial Contact Note Order Received and Verified, Occupational Therapy Evaluation in Progress with Full Report to Follow.   Prior Living Situation   Prior Services None   Housing / Facility 1 Story House   Steps Into Home 2   Steps In Home 0 "   Bathroom Set up Bathtub / Shower Combination   Equipment Owned Single Point Cane   Lives with - Patient's Self Care Capacity Significant Other   Comments Pt lives with her SO.  Pt unclear what AE they may have at home from other family members   Prior Level of ADL Function   Self Feeding Independent   Grooming / Hygiene Independent   Bathing Independent   Dressing Independent   Toileting Independent   Prior Level of IADL Function   Medication Management Independent   Laundry Independent   Kitchen Mobility Independent   Finances Independent   Home Management Independent   Shopping Independent   Prior Level Of Mobility Independent Without Device in Community   Driving / Transportation Driving Independent   Precautions   Precautions Fall Risk   Comments generalized weakness, knees buckle   Vitals   O2 Delivery Device None - Room Air   Pain   Pain Scales 0 to 10 Scale    Intervention Ambulation / Increased Activity   Pain 0 - 10 Group   Location Abdomen;Generalized   Description Aching;Sore   Therapist Pain Assessment During Activity;Nurse Notified;3   Cognition    Cognition / Consciousness X   Speech/ Communication Delayed Responses   Level of Consciousness Alert   Safety Awareness Impaired   New Learning Impaired   Attention Impaired   Comments pt reported some hallucinations from a medication.  Pt also had delayed processing & appeared overwhelmed with her current medical condition.   Passive ROM Upper Body   Passive ROM Upper Body WDL   Active ROM Upper Body   Active ROM Upper Body  WDL   Strength Upper Body   Upper Body Strength  X   Gross Strength Generalized Weakness, Equal Bilaterally.    Sensation Upper Body   Upper Extremity Sensation  WDL   Upper Body Muscle Tone   Upper Body Muscle Tone  WDL   Coordination Upper Body   Coordination WDL   Balance Assessment   Sitting Balance (Static) Fair   Sitting Balance (Dynamic) Fair -   Standing Balance (Static) Poor +   Standing Balance (Dynamic) Trace +   Weight  Shift Sitting Fair   Weight Shift Standing Fair   Bed Mobility    Supine to Sit Minimal Assist  (pt moves slowly needs extra time)   Sit to Supine   (pt left sitting up in chair)   Scooting Minimal Assist   Rolling Minimal Assist to Rt.   ADL Assessment   Eating Supervision   Grooming Minimal Assist;Seated   Bathing Moderate Assist   Upper Body Dressing Minimal Assist   Lower Body Dressing Maximal Assist   Toileting Maximal Assist   Functional Mobility   Sit to Stand Maximal Assist   Bed, Chair, Wheelchair Transfer Moderate Assist   Toilet Transfers Moderate Assist  (BSC)   Transfer Method Stand Step   Edema / Skin Assessment   Edema / Skin  X   Right Lower Extremity Edema 2+ Pitting   Left Lower Extremity Edema 2+ Pitting   Activity Tolerance   Sitting in Chair 25   Sitting Edge of Bed 10   Standing 1   Patient / Family Goals   Patient / Family Goal #1 I'd like some time to process the informaion I've been given   Short Term Goals   Short Term Goal # 1 Pt will be Min A for ADL transfers   Short Term Goal # 2 Pt will groom seated with SBA   Short Term Goal # 3 Pt will dress UE  with SBA   Education Group   Role of Occupational Therapist Patient Response Patient;Acceptance;Explanation;Verbal Demonstration   Occupational Therapy Initial Treatment Plan    Treatment Interventions Self Care / Activities of Daily Living;Cognitive Skill Development;Neuro Re-Education / Balance;Therapeutic Exercises;Therapeutic Activity   Treatment Frequency 3 Times per Week   Duration Until Therapy Goals Met   Problem List   Problem List Decreased Active Daily Living Skills;Decreased Upper Extremity Strength Right;Decreased Upper Extremity Strength Left;Decreased Homemaking Skills;Decreased Functional Mobility;Decreased Activity Tolerance;Safety Awareness Deficits / Cognition;Impaired Postural Control / Balance   Anticipated Discharge Equipment and Recommendations   DC Equipment Recommendations Wheelchair;Tub Transfer Bench;Bed Side  Commode;Hand Held Shower;Hospital Bed   Discharge Recommendations Other -  (pt is thinking of going home on Hospice)   Interdisciplinary Plan of Care Collaboration   IDT Collaboration with  Nursing;Physical Therapist   Patient Position at End of Therapy Seated;Chair Alarm On;Call Light within Reach;Tray Table within Reach;Phone within Reach   Collaboration Comments Nsg notified of OT findings   Session Information   Date / Session Number  1/1 #1 (1/3, 1/7)

## 2024-01-02 NOTE — CARE PLAN
The patient is Watcher - Medium risk of patient condition declining or worsening    Shift Goals  Clinical Goals: Pain control  Patient Goals: Sleep  Family Goals: none present at this time    Progress made toward(s) clinical / shift goals:     Problem: Knowledge Deficit - Standard  Goal: Patient and family/care givers will demonstrate understanding of plan of care, disease process/condition, diagnostic tests and medications  Outcome: Progressing     Problem: Skin Integrity  Goal: Skin integrity is maintained or improved  Outcome: Progressing     Problem: Fall Risk  Goal: Patient will remain free from falls  Outcome: Progressing

## 2024-01-02 NOTE — THERAPY
"Physical Therapy   Initial Evaluation     Patient Name: Natalya Allen  Age:  63 y.o., Sex:  female  Medical Record #: [id number]  Today's Date: 1/1/2024     Precautions  Precautions: Fall Risk    Assessment  Patient is a 63 y.o. female who was admitted with c/o abdominal pain and bloody stools. Pt found to have Hgb of 2.1, received transfusions, and EGD showed fungating ulcerated gastric mass that is cancerous. Per oncology, no treatment available due to size of mass, invasion into adjacent vasculature, and likely liver metastasis. PMH significant for breast cancer, osteopenia, hypothyroidism.     Pt received in bed and agreeable to PT evaluation. Pt seen for safety PT evaluation in setting of poor prognosis with recommendation of hospice care and pt desire to return home. Pt required min A for bed mobility using hospital bed features and mod to max A for STS and transfer to a chair. Discussed safe transfer technique, recommendation against ambulation given current weakness, and DME recommendations for return home with hospice and support from significant other. Pt will benefit from a hospital bed, bedside commode, FWW, and a wheelchair if pt wanting to mobilize in the home or outside. Pt will need assist from significant other with all mobility at current level of function. Will follow for acute PT for family training purposes and further recommendations as appropriate.    Plan    Physical Therapy Initial Treatment Plan   Treatment Plan : Bed Mobility, Neuro Re-Education / Balance, Equipment, Self Care / Home Evaluation, Therapeutic Activities  Treatment Frequency: 3 Times per Week  Duration: Until Therapy Goals Met    DC Equipment Recommendations: Front-Wheel Walker, Wheelchair, Bed Side Commode (Hospital bed)  Discharge Recommendations: Other - (Pt likely to return home with hospice as long as significant other able to provide assist needed, will benefit from family training in the home.)     Subjective    \"I need " "some time to process all of this and ask the oncologist more questions. It's a lot\"     Objective       01/01/24 1439   Precautions   Precautions Fall Risk   Pain 0 - 10 Group   Therapist Pain Assessment During Activity;Nurse Notified;2   Prior Living Situation   Prior Services None   Housing / Facility 1 Story House   Steps Into Home 2   Steps In Home 0   Equipment Owned Single Point Cane   Lives with - Patient's Self Care Capacity Significant Other   Comments Pt resides with her boyfriend, Ike, who she says is in good health.   Prior Level of Functional Mobility   Comments Pt reports she is typically independent with mobility, but has recently gotten significantly weaker to the point of being nearly bedbound x5 days prior to admission.   History of Falls   History of Falls No   Cognition    Level of Consciousness Alert   Comments Pleasant and cooperative, overwhelmed with diagnosis and hospice decisions. Limited insight into current level of function and implications for discharge.   Passive ROM Lower Body   Passive ROM Lower Body WDL   Active ROM Lower Body    Comments Limited by strength   Strength Lower Body   Comments Significant generalized weakness in BLE, 3-/5 with poor eccentric control and buckling in standing   Sensation Lower Body   Comments Denies LE sensory changes   Lower Body Muscle Tone   Lower Body Muscle Tone  WDL   Other Treatments   Other Treatments Provided Had discussion with pt about discharge recommendations, equipment needs being provided by hospice, current assistance needs, and safety concerns regarding return home.   Balance Assessment   Sitting Balance (Static) Fair -   Sitting Balance (Dynamic) Fair -   Standing Balance (Static) Poor +   Standing Balance (Dynamic) Poor +   Weight Shift Sitting Fair   Weight Shift Standing Fair   Comments with FWW, intermittent LE buckling   Bed Mobility    Supine to Sit Minimal Assist   Scooting Minimal Assist   Comments significant extra time for " using UE to move LE to EOB and needing repeated cues for how to scoot herself forward in preparation for transfers   Gait Analysis   Gait Level Of Assist Unable to Participate   Functional Mobility   Sit to Stand Maximal Assist   Bed, Chair, Wheelchair Transfer Moderate Assist   Transfer Method Stand Step   Mobility EOB>chair   Comments Mod to max A for STS from EOB and the chair, cues for hand placement, poor LE strength for STS and limited eccentric control for sitting back down each time. Mod A for transfer with cues for keeping knees in extension to prevent buckling   How much difficulty does the patient currently have...   Turning over in bed (including adjusting bedclothes, sheets and blankets)? 1   Sitting down on and standing up from a chair with arms (e.g., wheelchair, bedside commode, etc.) 1   Moving from lying on back to sitting on the side of the bed? 1   How much help from another person does the patient currently need...   Moving to and from a bed to a chair (including a wheelchair)? 2   Need to walk in a hospital room? 2   Climbing 3-5 steps with a railing? 1   6 clicks Mobility Score 8   Short Term Goals    Short Term Goal # 1 Pt will perform bed mobility with supervision to progress function in 6 visits.   Short Term Goal # 2 Pt will transfer with FWW and min A to progress function and decrease burden of care in 6 visits.   Short Term Goal # 3 Pt's significant other will demonstrate ability to assist pt with stand step transfer without cues to be able to assist at home in 6 visits.   Education Group   Education Provided Role of Physical Therapist   Role of Physical Therapist Patient Response Patient;Acceptance;Explanation;Verbal Demonstration   Physical Therapy Initial Treatment Plan    Treatment Plan  Bed Mobility;Neuro Re-Education / Balance;Equipment;Self Care / Home Evaluation;Therapeutic Activities   Treatment Frequency 3 Times per Week   Duration Until Therapy Goals Met   Problem List     Problems Functional Strength Deficit;Impaired Bed Mobility;Impaired Transfers;Impaired Ambulation;Decreased Activity Tolerance;Safety Awareness Deficits / Cognition   Anticipated Discharge Equipment and Recommendations   DC Equipment Recommendations Front-Wheel Walker;Wheelchair;Bed Side Commode  (Hospital bed)   Discharge Recommendations Other -  (Pt likely to return home with hospice as long as significant other able to provide assist needed, will benefit from family training in the home.)   Interdisciplinary Plan of Care Collaboration   IDT Collaboration with  Nursing;Occupational Therapist   Patient Position at End of Therapy Seated;Chair Alarm On;Call Light within Reach;Tray Table within Reach;Phone within Reach;Family / Friend in Room   Collaboration Comments RN updated

## 2024-01-03 ENCOUNTER — HOME CARE VISIT (OUTPATIENT)
Dept: HOSPICE | Facility: HOSPICE | Age: 64
End: 2024-01-03

## 2024-01-03 LAB
ANION GAP SERPL CALC-SCNC: 9 MMOL/L (ref 7–16)
ANISOCYTOSIS BLD QL SMEAR: ABNORMAL
BACTERIA BLD CULT: NORMAL
BACTERIA BLD CULT: NORMAL
BASOPHILS # BLD AUTO: 0 % (ref 0–1.8)
BASOPHILS # BLD: 0 K/UL (ref 0–0.12)
BUN SERPL-MCNC: 18 MG/DL (ref 8–22)
BURR CELLS BLD QL SMEAR: NORMAL
CALCIUM SERPL-MCNC: 7.5 MG/DL (ref 8.5–10.5)
CHLORIDE SERPL-SCNC: 104 MMOL/L (ref 96–112)
CO2 SERPL-SCNC: 22 MMOL/L (ref 20–33)
CREAT SERPL-MCNC: 0.5 MG/DL (ref 0.5–1.4)
EOSINOPHIL # BLD AUTO: 0.91 K/UL (ref 0–0.51)
EOSINOPHIL NFR BLD: 3.4 % (ref 0–6.9)
ERYTHROCYTE [DISTWIDTH] IN BLOOD BY AUTOMATED COUNT: 69.9 FL (ref 35.9–50)
GFR SERPLBLD CREATININE-BSD FMLA CKD-EPI: 106 ML/MIN/1.73 M 2
GLUCOSE SERPL-MCNC: 121 MG/DL (ref 65–99)
HCT VFR BLD AUTO: 24.8 % (ref 37–47)
HGB BLD-MCNC: 7.5 G/DL (ref 12–16)
HYPOCHROMIA BLD QL SMEAR: ABNORMAL
LYMPHOCYTES # BLD AUTO: 0.91 K/UL (ref 1–4.8)
LYMPHOCYTES NFR BLD: 3.4 % (ref 22–41)
MACROCYTES BLD QL SMEAR: ABNORMAL
MANUAL DIFF BLD: NORMAL
MCH RBC QN AUTO: 24.4 PG (ref 27–33)
MCHC RBC AUTO-ENTMCNC: 30.2 G/DL (ref 32.2–35.5)
MCV RBC AUTO: 80.8 FL (ref 81.4–97.8)
METAMYELOCYTES NFR BLD MANUAL: 0.9 %
MICROCYTES BLD QL SMEAR: ABNORMAL
MONOCYTES # BLD AUTO: 3.2 K/UL (ref 0–0.85)
MONOCYTES NFR BLD AUTO: 12 % (ref 0–13.4)
MORPHOLOGY BLD-IMP: NORMAL
MYELOCYTES NFR BLD MANUAL: 2.6 %
NEUTROPHILS # BLD AUTO: 20.53 K/UL (ref 1.82–7.42)
NEUTROPHILS NFR BLD: 76.9 % (ref 44–72)
NRBC # BLD AUTO: 1.02 K/UL
NRBC BLD-RTO: 3.8 /100 WBC (ref 0–0.2)
OVALOCYTES BLD QL SMEAR: NORMAL
PLATELET # BLD AUTO: 186 K/UL (ref 164–446)
PLATELET BLD QL SMEAR: NORMAL
PMV BLD AUTO: 10.4 FL (ref 9–12.9)
POIKILOCYTOSIS BLD QL SMEAR: NORMAL
POLYCHROMASIA BLD QL SMEAR: NORMAL
POTASSIUM SERPL-SCNC: 3.6 MMOL/L (ref 3.6–5.5)
PROMYELOCYTES NFR BLD MANUAL: 0.8 %
RBC # BLD AUTO: 3.07 M/UL (ref 4.2–5.4)
RBC BLD AUTO: PRESENT
SIGNIFICANT IND 70042: NORMAL
SIGNIFICANT IND 70042: NORMAL
SITE SITE: NORMAL
SITE SITE: NORMAL
SODIUM SERPL-SCNC: 135 MMOL/L (ref 135–145)
SOURCE SOURCE: NORMAL
SOURCE SOURCE: NORMAL
TARGETS BLD QL SMEAR: NORMAL
WBC # BLD AUTO: 26.7 K/UL (ref 4.8–10.8)
WBC TOXIC VACUOLES BLD QL SMEAR: NORMAL

## 2024-01-03 PROCEDURE — C9113 INJ PANTOPRAZOLE SODIUM, VIA: HCPCS | Performed by: INTERNAL MEDICINE

## 2024-01-03 PROCEDURE — A9270 NON-COVERED ITEM OR SERVICE: HCPCS

## 2024-01-03 PROCEDURE — 700102 HCHG RX REV CODE 250 W/ 637 OVERRIDE(OP)

## 2024-01-03 PROCEDURE — 99233 SBSQ HOSP IP/OBS HIGH 50: CPT | Performed by: STUDENT IN AN ORGANIZED HEALTH CARE EDUCATION/TRAINING PROGRAM

## 2024-01-03 PROCEDURE — A9270 NON-COVERED ITEM OR SERVICE: HCPCS | Performed by: HOSPITALIST

## 2024-01-03 PROCEDURE — 85027 COMPLETE CBC AUTOMATED: CPT

## 2024-01-03 PROCEDURE — 700105 HCHG RX REV CODE 258: Performed by: HOSPITALIST

## 2024-01-03 PROCEDURE — 85007 BL SMEAR W/DIFF WBC COUNT: CPT

## 2024-01-03 PROCEDURE — 700102 HCHG RX REV CODE 250 W/ 637 OVERRIDE(OP): Performed by: HOSPITALIST

## 2024-01-03 PROCEDURE — 770004 HCHG ROOM/CARE - ONCOLOGY PRIVATE *

## 2024-01-03 PROCEDURE — 80048 BASIC METABOLIC PNL TOTAL CA: CPT

## 2024-01-03 PROCEDURE — 700111 HCHG RX REV CODE 636 W/ 250 OVERRIDE (IP): Performed by: INTERNAL MEDICINE

## 2024-01-03 RX ORDER — BISACODYL 10 MG
10 SUPPOSITORY, RECTAL RECTAL
Status: DISCONTINUED | OUTPATIENT
Start: 2024-01-03 | End: 2024-01-04 | Stop reason: HOSPADM

## 2024-01-03 RX ORDER — AMOXICILLIN 250 MG
2 CAPSULE ORAL 2 TIMES DAILY
Status: DISCONTINUED | OUTPATIENT
Start: 2024-01-03 | End: 2024-01-04 | Stop reason: HOSPADM

## 2024-01-03 RX ORDER — POLYETHYLENE GLYCOL 3350 17 G/17G
1 POWDER, FOR SOLUTION ORAL
Status: DISCONTINUED | OUTPATIENT
Start: 2024-01-03 | End: 2024-01-04 | Stop reason: HOSPADM

## 2024-01-03 RX ADMIN — LORAZEPAM 1 MG: 1 TABLET ORAL at 12:37

## 2024-01-03 RX ADMIN — OXYCODONE 5 MG: 5 TABLET ORAL at 01:54

## 2024-01-03 RX ADMIN — OXYCODONE 5 MG: 5 TABLET ORAL at 09:56

## 2024-01-03 RX ADMIN — LORAZEPAM 1 MG: 1 TABLET ORAL at 01:54

## 2024-01-03 RX ADMIN — PANTOPRAZOLE SODIUM 40 MG: 40 INJECTION, POWDER, FOR SOLUTION INTRAVENOUS at 16:49

## 2024-01-03 RX ADMIN — POLYETHYLENE GLYCOL 3350 1 PACKET: 17 POWDER, FOR SOLUTION ORAL at 23:25

## 2024-01-03 RX ADMIN — SODIUM CHLORIDE, POTASSIUM CHLORIDE, SODIUM LACTATE AND CALCIUM CHLORIDE: 600; 310; 30; 20 INJECTION, SOLUTION INTRAVENOUS at 12:38

## 2024-01-03 RX ADMIN — OXYCODONE 5 MG: 5 TABLET ORAL at 23:25

## 2024-01-03 RX ADMIN — PANTOPRAZOLE SODIUM 40 MG: 40 INJECTION, POWDER, FOR SOLUTION INTRAVENOUS at 04:20

## 2024-01-03 RX ADMIN — OXYCODONE 5 MG: 5 TABLET ORAL at 16:48

## 2024-01-03 RX ADMIN — SODIUM CHLORIDE, POTASSIUM CHLORIDE, SODIUM LACTATE AND CALCIUM CHLORIDE: 600; 310; 30; 20 INJECTION, SOLUTION INTRAVENOUS at 01:47

## 2024-01-03 RX ADMIN — SODIUM CHLORIDE, POTASSIUM CHLORIDE, SODIUM LACTATE AND CALCIUM CHLORIDE: 600; 310; 30; 20 INJECTION, SOLUTION INTRAVENOUS at 21:30

## 2024-01-03 RX ADMIN — DOCUSATE SODIUM 50 MG AND SENNOSIDES 8.6 MG 2 TABLET: 8.6; 5 TABLET, FILM COATED ORAL at 23:25

## 2024-01-03 ASSESSMENT — PAIN DESCRIPTION - PAIN TYPE
TYPE: ACUTE PAIN
TYPE: ACUTE PAIN;CHRONIC PAIN
TYPE: ACUTE PAIN
TYPE: ACUTE PAIN;CHRONIC PAIN

## 2024-01-03 ASSESSMENT — ENCOUNTER SYMPTOMS
NERVOUS/ANXIOUS: 1
NAUSEA: 0
HALLUCINATIONS: 1
VOMITING: 0
WEIGHT LOSS: 1
ABDOMINAL PAIN: 1

## 2024-01-03 NOTE — DIETARY
Nutrition Services: Update   Day 5 of admit.  Natalya Allen is a 63 y.o. female with admitting DX of Hemorrhagic shock (HCC) [R57.8]    Pt is currently on Regular diet. PO intake 25-50% x 1 meal. Wt on 12/30: 61.5 kg via bed scale.     Met with pt at bedside. Pt shared that she has noticed weight loss since August with a UBW of 147 lbs. This is a 8% weight loss x ~6 mo which is notable though not clinically significant. Pt was unable to relay PO history but did share that she's had a decreased appetite. Pt is amenable to receiving oral nutrition supplements TID but no vanilla.     Problem: Nutritional:  Goal: Achieve adequate nutritional intake  Description: Patient will consume >50% of meals  Outcome: Not met.    Malnutrition Risk: Does not meet ASPEN criteria at this time. At risk based on weight loss and reports of poor appetite.     Recommendations/Plan:  Provide oral nutrition supplements TID    Encourage intake of meals  Document intake of all meals as % taken in ADL's to provide interdisciplinary communication across all shifts.   Monitor weight.  Nutrition rep will continue to see patient for ongoing meal and snack preferences.  Obtain supplement order per RD as needed.    RD following

## 2024-01-03 NOTE — DISCHARGE PLANNING
Case Management Discharge Planning    Admission Date: 12/29/2023  GMLOS: 4.6  ALOS: 5    6-Clicks ADL Score: 15  6-Clicks Mobility Score: 8      Anticipated Discharge Dispo: Discharge Disposition: D/T to hospice home (50)    DME Needed: No    Action(s) Taken: Discussed in IDT rounds, pt is medically cleared to dc home with hospice pending hospice conversation. Pt spoke with [facility] and per her S/O Ike he has already spoken with [facility] for a pro alec hospice case. Faxed referral to [facility], per their admission team they were not aware of pro alec referral. They have reviewed referral and have accepted pt as pro alec, [facility] to contact S/O to determine what DME needs to be delivered and plan to dc home tomorrow.     Spoke with , he stated no DME needed and will take pt home tomorrow morning,  to be here at 0930.     Escalations Completed: None    Medically Clear: Yes    Next Steps: Pending hospice DME delivery.     Barriers to Discharge: None    Is the patient up for discharge tomorrow: Yes    Is transport arranged for discharge disposition: Yes

## 2024-01-03 NOTE — PROGRESS NOTES
Hospital Medicine Daily Progress Note    Date of Service  1/2/2024    Chief Complaint  Natalya Allen is a 63 y.o. female admitted 12/29/2023 with abdominal mass, hemorrhagic shock    Hospital Course    63 y.o. female who presented 12/29/2023 with a history of breast cancer s/p B mastectomies, ETOH abuse who quite 2 months ago and hx of chronic back pain. Who Presented with generalized wekness and melena with abdominal pain.  On presentation she was found to have an Hgb of 2.1 and was hypotensive.  Intubated and initiated on massive transfusion protocol.  She went for EGD on the same day with findings of a fungating ulcerated mass in the stomach consistent with gastric cancer.  Subsequent CT A/P showing a large gastric based mass.  S/p EGD was done 12/29 hemoglobin explained by  ulcerated tumor.  Biopsy taken.  GI recommended hospice given extent of tumor and high risk for re bleed. Oncology was consulted and again given extent of stomach tumor and numerous liver metastasis they recommended comfort care for hospice as patient not candidate systemic  therapy given advanced disease and poor performance status.    Interval Problem Update    Pt seen and examined, awake and alert but lethargic, tells me she is hallucinating, seeing things that aren't there. Able to verbalize her medical issues. Expresses that she feels that she is being pushed into hospice care, discussed that she can choose to pursue hospice or not however there are no options for curative treatment, she verbalized understanding, she wants to speak what hospice team.   - vitals stable.   - remains on IV PPI and LR , supportive care with pain control and antiemetics   - H/H stable, persistently elevated WBC       Patient is high risk for deterioration into hemorrhagic shock.  Need close monitoring.  I have consulted palliative and hospice team for further discussion.      I have discussed this patient's plan of care and discharge plan at IDT rounds today  with Case Management, Nursing, Nursing leadership, and other members of the IDT team.    Consultants/Specialty  critical care, GI, and oncology    Code Status  DNAR/DNI    Disposition    Anticipate discharge to: hospice    I have placed the appropriate orders for post-discharge needs.    Review of Systems  Review of Systems   Constitutional:  Positive for malaise/fatigue and weight loss.   Gastrointestinal:  Positive for abdominal pain. Negative for nausea and vomiting.   Psychiatric/Behavioral:  Positive for hallucinations. The patient is nervous/anxious.         Physical Exam  Temp:  [36.5 °C (97.7 °F)-37.4 °C (99.4 °F)] 36.6 °C (97.8 °F)  Pulse:  [] 103  Resp:  [18-20] 20  BP: (109-139)/(70-81) 134/78  SpO2:  [92 %-97 %] 97 %    Physical Exam  Vitals and nursing note reviewed.   Constitutional:       Appearance: She is ill-appearing.   HENT:      Head: Normocephalic and atraumatic.      Mouth/Throat:      Mouth: Mucous membranes are moist.   Cardiovascular:      Rate and Rhythm: Regular rhythm. Tachycardia present.   Pulmonary:      Effort: Pulmonary effort is normal.      Breath sounds: Normal breath sounds.   Abdominal:      General: There is distension.      Tenderness: There is abdominal tenderness.   Musculoskeletal:      Right lower leg: No edema.      Left lower leg: No edema.   Skin:     General: Skin is warm.      Coloration: Skin is pale.   Neurological:      General: No focal deficit present.      Mental Status: She is alert and oriented to person, place, and time.   Psychiatric:         Mood and Affect: Mood normal.         Fluids    Intake/Output Summary (Last 24 hours) at 1/2/2024 1702  Last data filed at 1/2/2024 1527  Gross per 24 hour   Intake --   Output 700 ml   Net -700 ml       Laboratory  Recent Labs     12/31/23  1245 01/01/24  0040 01/02/24  0603   WBC 41.1* 40.6* 33.8*   RBC 3.17* 3.25* 3.05*   HEMOGLOBIN 8.2* 8.2* 7.6*   HEMATOCRIT 25.3* 26.2* 24.7*   MCV 79.8* 80.6* 81.0*   MCH  25.9* 25.2* 24.9*   MCHC 32.4 31.3* 30.8*   RDW 64.7* 66.7* 69.4*   PLATELETCT 189 177 177   MPV 10.5 10.1 11.1     Recent Labs     01/01/24  0040 01/02/24  0603   SODIUM 137 137   POTASSIUM 4.2 4.1   CHLORIDE 105 106   CO2 20 21   GLUCOSE 134* 104*   BUN 40* 29*   CREATININE 1.04 0.71   CALCIUM 7.5* 7.7*                       Imaging  DX-CHEST-PORTABLE (1 VIEW)   Final Result      No significant interval change.      CT-ABDOMEN-PELVIS W/O   Final Result      1.  NG tube tip is in the distal stomach.   2.  Redemonstration of left upper quadrant mass, likely gastric in origin, detailed on the recent contrast-enhanced CT.   3.  Redemonstration of bilobar hepatic metastases.   4.  Findings of renal dysfunction wall multiple small bilateral renal infarcts.   5.  Spleen was better evaluated on the recent contrast-enhanced CT.   6.  Multiple retroperitoneal cheryl metastases.   7.  Trace hyperdense pelvic ascites.         DX-ABDOMEN FOR TUBE PLACEMENT   Final Result      Enteric tube tip projects over the distal stomach      CT-CHEST,ABDOMEN,PELVIS WITH   Final Result      1.  Large lobulated mass in the left upper quadrant communicates with the stomach contains multiple foci of air. The mass likely arises from the stomach and is consistent with malignancy      2.  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  Multiple hypodense hepatic masses are consistent with metastases      4.  No acute intrathoracic findings      5.  Nonspecific 3 mm right upper lobe nodule. Follow-up per oncology protocol      CT-HEAD W/O   Final Result      1.  No acute intracranial findings.      2.  Left mastoid effusion. Mucosal thickening or polyp in the right nare               DX-CHEST-PORTABLE (1 VIEW)   Final Result      1.  Endotracheal tube tip projects approximately 4.4 cm above the autumn      2.  Right internal jugular catheter tip projects over the  cavoatrial junction. No pneumothorax is identified           Assessment/Plan  * Hemorrhagic shock (HCC)- (present on admission)  Assessment & Plan  Secondary to fungating gastric mass   S/p resuscitation vitals are now stable  Will cont to support pt while Hospice is arranged and to give her time to put her affiars in order    Severe protein-calorie malnutrition (HCC)  Assessment & Plan   Severe malnutrition in the context of chronic illness  Dietitian consulted  Monitor weight, prevent wt loss   Monitor electrolytes to assess risk for refeeding syndrome,      Hospice care  Assessment & Plan  She agreed hospice care  Case med assisting  Palliative has been consulted to assist further discussion including comfort care    Gastric cancer (HCC)  Assessment & Plan  With mets to liver  Reviewed with Oncology who recommend Hospice; consult placed  Change to DNR/I per DW pt  Cont to support with blood products if needed to allow her time to sort out her affairs and arrange home hospice where she would prefer to be with her boyfriend and her dogs  Prn orders for MSO4 IV and oxycodone as well as po ativan and IV valium    Leucocytosis  Assessment & Plan  Leukocytosis stress-induced and ongoing malignancy  No concern of ongoing infection  Continue to monitor off antibiotic      Abnormal transaminases  Assessment & Plan  Secondary to liver mets noted on CT    Acute encephalopathy  Assessment & Plan  S/p resuscitation pt is now alert and oriented    Acute kidney injury (HCC)  Assessment & Plan  Creat stable around 1.7-1.8    Acute respiratory failure with hypoxia (HCC)  Assessment & Plan  Extubated 12/30  O2/RT protocols    History of breast cancer- (present on admission)  Assessment & Plan  History of breast cancer         VTE prophylaxis: Not a candidate for chemical prophylaxis given ongoing bleeding    I have performed a physical exam and reviewed and updated ROS and Plan today (1/2/2024). In review of yesterday's note  (1/1/2024), there are no changes except as documented above.       Greater than 51 minutes spent preparing to see patient (e.g. review of tests) obtaining and/or reviewing separately obtained history. Performing a medically appropriate examination and/ evaluation.  Counseling and educating the patient/family/caregiver.  Ordering medications, tests, or procedures.  Referring and communicating with other health care professionals.  Documenting clinical information in EPIC.  Independently interpreting results and communicating results to patient/family/caregiver.  Care coordination.

## 2024-01-03 NOTE — DISCHARGE PLANNING
1548  Agency/Facility Name: [facility]  Spoke To: Jb  Outcome: Per Jb had some question of when DC is cleared. DPA informed Jb about chart reviewed notes for Pt regarding possible needs. Gee Turner spoke with Pt Family that they do not believe she needs a hospital bed or anything. DPA informed Jb will reach out to  and get back to [facility].  LSW notified via phone.

## 2024-01-03 NOTE — PROGRESS NOTES
Hospital Medicine Daily Progress Note    Date of Service  1/3/2024    Chief Complaint  Natalya Allen is a 63 y.o. female admitted 12/29/2023 with abdominal mass, hemorrhagic shock    Hospital Course    63 y.o. female who presented 12/29/2023 with a history of breast cancer s/p B mastectomies, ETOH abuse who quite 2 months ago and hx of chronic back pain. Who Presented with generalized wekness and melena with abdominal pain.  On presentation she was found to have an Hgb of 2.1 and was hypotensive.  Intubated and initiated on massive transfusion protocol.  She went for EGD on the same day with findings of a fungating ulcerated mass in the stomach consistent with gastric cancer.  Subsequent CT A/P showing a large gastric based mass.  S/p EGD was done 12/29 hemoglobin explained by  ulcerated tumor.  Biopsy taken.  GI recommended hospice given extent of tumor and high risk for re bleed. Oncology was consulted and again given extent of stomach tumor and numerous liver metastasis they recommended comfort care for hospice as patient not candidate systemic  therapy given advanced disease and poor performance status.    Interval Problem Update    Pt seen and examined, awake and alert but lethargic, slowed mentation although answering appropriately   - hospice has accepted, they will be reaching out to SO to determine dc needs.   - continue supportive care   - remains on IV PPI and LR , supportive care with pain control and antiemetics   - H/H stable, persistently elevated WBC     Pt has advanced metastatic gastric carcinoma not amenable to therapy, discharge plan pending hospice     I have discussed this patient's plan of care and discharge plan at IDT rounds today with Case Management, Nursing, Nursing leadership, and other members of the IDT team.    Consultants/Specialty  critical care, GI, and oncology    Code Status  DNAR/DNI    Disposition    Anticipate discharge to: hospice    I have placed the appropriate orders for  post-discharge needs.    Review of Systems  Review of Systems   Constitutional:  Positive for malaise/fatigue and weight loss.   Gastrointestinal:  Positive for abdominal pain. Negative for nausea and vomiting.   Psychiatric/Behavioral:  Positive for hallucinations. The patient is nervous/anxious.         Physical Exam  Temp:  [36.6 °C (97.8 °F)-36.9 °C (98.5 °F)] 36.9 °C (98.5 °F)  Pulse:  [] 98  Resp:  [18-20] 18  BP: (125-134)/(73-78) 130/73  SpO2:  [92 %-97 %] 96 %    Physical Exam  Vitals and nursing note reviewed.   Constitutional:       Appearance: She is ill-appearing.   HENT:      Head: Normocephalic and atraumatic.      Mouth/Throat:      Mouth: Mucous membranes are moist.   Eyes:      General: Scleral icterus present.   Cardiovascular:      Rate and Rhythm: Regular rhythm. Tachycardia present.   Pulmonary:      Effort: Pulmonary effort is normal.      Breath sounds: Normal breath sounds.   Abdominal:      General: There is distension.      Tenderness: There is abdominal tenderness.   Musculoskeletal:      Right lower leg: No edema.      Left lower leg: No edema.   Skin:     General: Skin is warm.      Coloration: Skin is pale.   Neurological:      General: No focal deficit present.      Mental Status: She is alert and oriented to person, place, and time.   Psychiatric:         Mood and Affect: Mood normal.      Comments: Slowed mentation          Fluids    Intake/Output Summary (Last 24 hours) at 1/3/2024 1506  Last data filed at 1/3/2024 0956  Gross per 24 hour   Intake 370 ml   Output 1000 ml   Net -630 ml         Laboratory  Recent Labs     01/01/24  0040 01/02/24  0603 01/03/24  0430   WBC 40.6* 33.8* 26.7*   RBC 3.25* 3.05* 3.07*   HEMOGLOBIN 8.2* 7.6* 7.5*   HEMATOCRIT 26.2* 24.7* 24.8*   MCV 80.6* 81.0* 80.8*   MCH 25.2* 24.9* 24.4*   MCHC 31.3* 30.8* 30.2*   RDW 66.7* 69.4* 69.9*   PLATELETCT 177 177 186   MPV 10.1 11.1 10.4       Recent Labs     01/01/24  0040 01/02/24  0603 01/03/24  0430    SODIUM 137 137 135   POTASSIUM 4.2 4.1 3.6   CHLORIDE 105 106 104   CO2 20 21 22   GLUCOSE 134* 104* 121*   BUN 40* 29* 18   CREATININE 1.04 0.71 0.50   CALCIUM 7.5* 7.7* 7.5*                         Imaging  DX-CHEST-PORTABLE (1 VIEW)   Final Result      No significant interval change.      CT-ABDOMEN-PELVIS W/O   Final Result      1.  NG tube tip is in the distal stomach.   2.  Redemonstration of left upper quadrant mass, likely gastric in origin, detailed on the recent contrast-enhanced CT.   3.  Redemonstration of bilobar hepatic metastases.   4.  Findings of renal dysfunction wall multiple small bilateral renal infarcts.   5.  Spleen was better evaluated on the recent contrast-enhanced CT.   6.  Multiple retroperitoneal cheryl metastases.   7.  Trace hyperdense pelvic ascites.         DX-ABDOMEN FOR TUBE PLACEMENT   Final Result      Enteric tube tip projects over the distal stomach      CT-CHEST,ABDOMEN,PELVIS WITH   Final Result      1.  Large lobulated mass in the left upper quadrant communicates with the stomach contains multiple foci of air. The mass likely arises from the stomach and is consistent with malignancy      2.  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  Multiple hypodense hepatic masses are consistent with metastases      4.  No acute intrathoracic findings      5.  Nonspecific 3 mm right upper lobe nodule. Follow-up per oncology protocol      CT-HEAD W/O   Final Result      1.  No acute intracranial findings.      2.  Left mastoid effusion. Mucosal thickening or polyp in the right nare               DX-CHEST-PORTABLE (1 VIEW)   Final Result      1.  Endotracheal tube tip projects approximately 4.4 cm above the autumn      2.  Right internal jugular catheter tip projects over the cavoatrial junction. No pneumothorax is identified           Assessment/Plan  * Hemorrhagic shock (HCC)- (present on  admission)  Assessment & Plan  Secondary to fungating gastric mass   S/p resuscitation vitals are now stable  Will cont to support pt while Hospice is arranged and to give her time to put her affiars in order    Severe protein-calorie malnutrition (HCC)  Assessment & Plan   Severe malnutrition in the context of chronic illness  Dietitian consulted  Monitor weight, prevent wt loss   Monitor electrolytes to assess risk for refeeding syndrome,      Hospice care  Assessment & Plan  She agreed hospice care  Case med assisting  Palliative has been consulted to assist further discussion including comfort care    Gastric cancer (HCC)  Assessment & Plan  With mets to liver  Reviewed with Oncology who recommend Hospice; consult placed  Change to DNR/I per DW pt  Cont to support with blood products if needed to allow her time to sort out her affairs and arrange home hospice where she would prefer to be with her boyfriend and her dogs  Prn orders for MSO4 IV and oxycodone as well as po ativan and IV valium    Leucocytosis  Assessment & Plan  Leukocytosis stress-induced and ongoing malignancy  No concern of ongoing infection  Continue to monitor off antibiotic      Abnormal transaminases  Assessment & Plan  Secondary to liver mets noted on CT    Acute encephalopathy  Assessment & Plan  S/p resuscitation pt is now alert and oriented    Acute kidney injury (HCC)  Assessment & Plan  Creat stable around 1.7-1.8    Acute respiratory failure with hypoxia (HCC)  Assessment & Plan  Extubated 12/30  O2/RT protocols    History of breast cancer- (present on admission)  Assessment & Plan  History of breast cancer         VTE prophylaxis: Not a candidate for chemical prophylaxis given ongoing bleeding    I have performed a physical exam and reviewed and updated ROS and Plan today (1/3/2024). In review of yesterday's note (1/2/2024), there are no changes except as documented above.       Greater than 51 minutes spent preparing to see patient  (e.g. review of tests) obtaining and/or reviewing separately obtained history. Performing a medically appropriate examination and/ evaluation.  Counseling and educating the patient/family/caregiver.  Ordering medications, tests, or procedures.  Referring and communicating with other health care professionals.  Documenting clinical information in EPIC.  Independently interpreting results and communicating results to patient/family/caregiver.  Care coordination.

## 2024-01-03 NOTE — CARE PLAN
The patient is Watcher - Medium risk of patient condition declining or worsening    Shift Goals  Clinical Goals: pain control  Patient Goals: rest and comfort  Family Goals: none present at this time    Progress made toward(s) clinical / shift goals:    Problem: Skin Integrity  Goal: Skin integrity is maintained or improved  Outcome: Progressing     Problem: Fall Risk  Goal: Patient will remain free from falls  Outcome: Progressing     Problem: Pain - Standard  Goal: Alleviation of pain or a reduction in pain to the patient’s comfort goal  Outcome: Progressing       Patient is not progressing towards the following goals:

## 2024-01-04 ENCOUNTER — PHARMACY VISIT (OUTPATIENT)
Dept: PHARMACY | Facility: MEDICAL CENTER | Age: 64
End: 2024-01-04
Payer: COMMERCIAL

## 2024-01-04 VITALS
WEIGHT: 135.58 LBS | RESPIRATION RATE: 19 BRPM | DIASTOLIC BLOOD PRESSURE: 91 MMHG | HEIGHT: 68 IN | BODY MASS INDEX: 20.55 KG/M2 | TEMPERATURE: 99.2 F | SYSTOLIC BLOOD PRESSURE: 144 MMHG | HEART RATE: 105 BPM | OXYGEN SATURATION: 96 %

## 2024-01-04 LAB
ANISOCYTOSIS BLD QL SMEAR: ABNORMAL
BASOPHILS # BLD AUTO: 0 % (ref 0–1.8)
BASOPHILS # BLD: 0 K/UL (ref 0–0.12)
EOSINOPHIL # BLD AUTO: 1.02 K/UL (ref 0–0.51)
EOSINOPHIL NFR BLD: 4.4 % (ref 0–6.9)
ERYTHROCYTE [DISTWIDTH] IN BLOOD BY AUTOMATED COUNT: 70.3 FL (ref 35.9–50)
HCT VFR BLD AUTO: 24.8 % (ref 37–47)
HGB BLD-MCNC: 7.6 G/DL (ref 12–16)
HYPOCHROMIA BLD QL SMEAR: ABNORMAL
LYMPHOCYTES # BLD AUTO: 1.2 K/UL (ref 1–4.8)
LYMPHOCYTES NFR BLD: 5.2 % (ref 22–41)
MANUAL DIFF BLD: NORMAL
MCH RBC QN AUTO: 24.8 PG (ref 27–33)
MCHC RBC AUTO-ENTMCNC: 30.6 G/DL (ref 32.2–35.5)
MCV RBC AUTO: 80.8 FL (ref 81.4–97.8)
MICROCYTES BLD QL SMEAR: ABNORMAL
MONOCYTES # BLD AUTO: 1.22 K/UL (ref 0–0.85)
MONOCYTES NFR BLD AUTO: 5.3 % (ref 0–13.4)
MORPHOLOGY BLD-IMP: NORMAL
MYELOCYTES NFR BLD MANUAL: 3.5 %
NEUTROPHILS # BLD AUTO: 18.85 K/UL (ref 1.82–7.42)
NEUTROPHILS NFR BLD: 81.6 % (ref 44–72)
NRBC # BLD AUTO: 0.24 K/UL
NRBC BLD-RTO: 1 /100 WBC (ref 0–0.2)
PLATELET # BLD AUTO: 228 K/UL (ref 164–446)
PLATELET BLD QL SMEAR: NORMAL
PMV BLD AUTO: 11 FL (ref 9–12.9)
POIKILOCYTOSIS BLD QL SMEAR: NORMAL
POLYCHROMASIA BLD QL SMEAR: NORMAL
RBC # BLD AUTO: 3.07 M/UL (ref 4.2–5.4)
RBC BLD AUTO: PRESENT
TARGETS BLD QL SMEAR: NORMAL
WBC # BLD AUTO: 23.1 K/UL (ref 4.8–10.8)

## 2024-01-04 PROCEDURE — 97535 SELF CARE MNGMENT TRAINING: CPT

## 2024-01-04 PROCEDURE — 700111 HCHG RX REV CODE 636 W/ 250 OVERRIDE (IP): Performed by: INTERNAL MEDICINE

## 2024-01-04 PROCEDURE — C9113 INJ PANTOPRAZOLE SODIUM, VIA: HCPCS | Performed by: INTERNAL MEDICINE

## 2024-01-04 PROCEDURE — 99239 HOSP IP/OBS DSCHRG MGMT >30: CPT | Performed by: STUDENT IN AN ORGANIZED HEALTH CARE EDUCATION/TRAINING PROGRAM

## 2024-01-04 PROCEDURE — A9270 NON-COVERED ITEM OR SERVICE: HCPCS

## 2024-01-04 PROCEDURE — 700102 HCHG RX REV CODE 250 W/ 637 OVERRIDE(OP)

## 2024-01-04 PROCEDURE — 85007 BL SMEAR W/DIFF WBC COUNT: CPT

## 2024-01-04 PROCEDURE — RXMED WILLOW AMBULATORY MEDICATION CHARGE: Performed by: STUDENT IN AN ORGANIZED HEALTH CARE EDUCATION/TRAINING PROGRAM

## 2024-01-04 PROCEDURE — 700105 HCHG RX REV CODE 258: Performed by: HOSPITALIST

## 2024-01-04 PROCEDURE — 85027 COMPLETE CBC AUTOMATED: CPT

## 2024-01-04 PROCEDURE — 700102 HCHG RX REV CODE 250 W/ 637 OVERRIDE(OP): Performed by: HOSPITALIST

## 2024-01-04 PROCEDURE — A9270 NON-COVERED ITEM OR SERVICE: HCPCS | Performed by: HOSPITALIST

## 2024-01-04 PROCEDURE — 97530 THERAPEUTIC ACTIVITIES: CPT

## 2024-01-04 RX ORDER — OXYCODONE HYDROCHLORIDE 5 MG/1
5 TABLET ORAL EVERY 4 HOURS PRN
Qty: 30 TABLET | Refills: 0 | Status: SHIPPED | OUTPATIENT
Start: 2024-01-04 | End: 2024-01-09

## 2024-01-04 RX ORDER — AMOXICILLIN 250 MG
2 CAPSULE ORAL 2 TIMES DAILY
Qty: 30 TABLET | Refills: 0 | Status: SHIPPED | OUTPATIENT
Start: 2024-01-04

## 2024-01-04 RX ORDER — LORAZEPAM 1 MG/1
1 TABLET ORAL EVERY 4 HOURS PRN
Qty: 30 TABLET | Refills: 0 | Status: SHIPPED | OUTPATIENT
Start: 2024-01-04 | End: 2024-01-09

## 2024-01-04 RX ORDER — OMEPRAZOLE 40 MG/1
40 CAPSULE, DELAYED RELEASE ORAL DAILY
Qty: 30 CAPSULE | Refills: 3 | Status: SHIPPED | OUTPATIENT
Start: 2024-01-04

## 2024-01-04 RX ADMIN — MAGNESIUM HYDROXIDE 30 ML: 1200 LIQUID ORAL at 04:35

## 2024-01-04 RX ADMIN — SODIUM CHLORIDE, POTASSIUM CHLORIDE, SODIUM LACTATE AND CALCIUM CHLORIDE: 600; 310; 30; 20 INJECTION, SOLUTION INTRAVENOUS at 07:11

## 2024-01-04 RX ADMIN — OXYCODONE 5 MG: 5 TABLET ORAL at 08:07

## 2024-01-04 RX ADMIN — LORAZEPAM 1 MG: 1 TABLET ORAL at 09:50

## 2024-01-04 RX ADMIN — PANTOPRAZOLE SODIUM 40 MG: 40 INJECTION, POWDER, FOR SOLUTION INTRAVENOUS at 04:35

## 2024-01-04 RX ADMIN — DOCUSATE SODIUM 50 MG AND SENNOSIDES 8.6 MG 2 TABLET: 8.6; 5 TABLET, FILM COATED ORAL at 04:35

## 2024-01-04 ASSESSMENT — COGNITIVE AND FUNCTIONAL STATUS - GENERAL
MOBILITY SCORE: 6
MOVING FROM LYING ON BACK TO SITTING ON SIDE OF FLAT BED: UNABLE
CLIMB 3 TO 5 STEPS WITH RAILING: TOTAL
SUGGESTED CMS G CODE MODIFIER MOBILITY: CN
WALKING IN HOSPITAL ROOM: TOTAL
MOVING TO AND FROM BED TO CHAIR: UNABLE
TURNING FROM BACK TO SIDE WHILE IN FLAT BAD: UNABLE
STANDING UP FROM CHAIR USING ARMS: TOTAL

## 2024-01-04 ASSESSMENT — PAIN DESCRIPTION - PAIN TYPE
TYPE: ACUTE PAIN;CHRONIC PAIN
TYPE: ACUTE PAIN

## 2024-01-04 ASSESSMENT — GAIT ASSESSMENTS: GAIT LEVEL OF ASSIST: UNABLE TO PARTICIPATE

## 2024-01-04 NOTE — DISCHARGE PLANNING
DC Transport Scheduled    Received request at: 1/4/2024 at 0926    Transport Company Scheduled:  [facility]  Spoke with Yumiko at [facility] to schedule transport.    Scheduled Date: 1/4/2024  Scheduled Time: 1200    Destination: Home at [address] [place], [place] [address]luanne [place]     Notified care team of scheduled transport via [employer].     If there are any changes needed to the DC transportation scheduled, please contact [facility] at ext. [phone] between the hours of 6485-5631 Mon-Fri. If outside those hours, contact the ED Case Manager at ext. [phone].

## 2024-01-04 NOTE — DISCHARGE PLANNING
Case Management Discharge Planning    Admission Date: 12/29/2023  GMLOS: 4.6  ALOS: 5    6-Clicks ADL Score: 15  6-Clicks Mobility Score: 8    Anticipated Discharge Dispo: Discharge Disposition: D/T to hospice home (50)    DME Needed: No    Action(s) Taken: OTHER  CM RN spoke with Jb at [facility] ([phone]). Pt scheduled to have admit done tomorrow at 1200.     Escalations Completed: None    Medically Clear: Yes    Next Steps: Pending hospice DME delivery.    Barriers to Discharge: None     Is the patient up for discharge tomorrow: Yes    Is transport arranged for discharge disposition: Yes

## 2024-01-04 NOTE — THERAPY
Physical Therapy Contact Note    Patient Name: Natalya Allen  Age:  63 y.o., Sex:  female  Medical Record #: [id number]  Today's Date: 1/3/2024    Chart reviewed, pt now planned for home on hospice; unclear need for family training; SO not at bedside upon attempt, pt slightly confused upon attempt to discuss family training need; she will discuss with SO to ensure no questions about physical function as disease process continues; did not appear to grasp progressive debility as it relates to hospice/end of life; will return as needed per questions from pt/SO;     Of note, SO at bedside a bit later than attempt, possible dc questions; will return tomorrow ~ 8 AM as SO/pt need for physical training.     Ness MONZON, PT,  [phone]

## 2024-01-04 NOTE — DISCHARGE INSTRUCTIONS
Gastrointestinal Bleeding  Gastrointestinal (GI) bleeding is bleeding anywhere along the digestive tract. The digestive tract carries food from your mouth until it leaves your body as waste, or poop. You may have blood in your poop or have black poop. If you vomit, there may be blood in it too.  This condition can be mild, serious, or even life-threatening. If you have a lot of bleeding, you may need to stay in the hospital.  What are the causes?  This condition may be caused by:  Irritation and swelling of the esophagus (esophagitis). The esophagus is part of the body that moves food from your mouth to your stomach.  Swollen veins in the butt (hemorrhoids).  Tears in the opening of the butt. These are often caused by passing hard poop.  Pouches that form on the colon (diverticulosis).  Irritation and swelling of pouches that have formed in your colon (diverticulitis).  Growths (polyps) or cancer.  Irritation of the stomach lining (gastritis).  Sores (ulcers) in the stomach.  What increases the risk?  You are more likely to develop this condition if:  You have a certain type of infection in your stomach called Helicobacter pylori infection.  You take certain medicines.  You smoke.  You drink alcohol.  What are the signs or symptoms?  Common symptoms of this condition include:  Vomiting material that has bright red blood in it. It may look like coffee grounds.  Changes in your poop. The poop:  May have red blood in it.  May be black, look like tar, and smell stronger than normal.  May be red.  Pain or cramping in the belly (abdomen).  How is this treated?  Treatment for this condition depends on the cause of the bleeding. For example:  Your doctor may treat the bleeding during diagnosis. Common ways of diagnosing this condition is endoscopy or colonoscopy.  Medicines can be used to:  Help control irritation, swelling, or infection.  Reduce acid in your stomach.  Certain problems can be treated  with:  Creams.  Medicines that are put in the butt (suppositories).  Warm baths.  Surgery is sometimes needed.  If you lose a lot of blood, you may need a blood transfusion.  If there is a lot of bleeding, you will need to stay in the hospital. If bleeding is mild, you may be allowed to go home.  Follow these instructions at home:    Take over-the-counter and prescription medicines only as told by your doctor.  Eat foods that have a lot of fiber in them. These foods include beans, whole grains, and fresh fruits and vegetables. You can also try eating 1-3 prunes each day.  Drink enough fluid to keep your pee (urine) pale yellow.  Keep all follow-up visits.  Contact a doctor if:  Your symptoms do not get better with treatment.  Get help right away if:  Your bleeding does not stop.  You feel dizzy or you pass out (faint).  You feel weak.  You have very bad cramps in your back or belly.  You pass large clumps of blood (clots) in your poop.  Your symptoms are getting worse.  You have chest pain or fast heartbeats.  These symptoms may be an emergency. Get help right away. Call 911.  Do not wait to see if the symptoms will go away.  Do not drive yourself to the hospital.  Summary  Gastrointestinal (GI) bleeding is bleeding anywhere along the digestive tract. The digestive tract carries food from your mouth until it leaves your body as waste, or poop.  This bleeding can be caused by many things. Treatment depends on the cause of the bleeding.  Take medicines only as told by your doctor.  Keep all follow-up visits.  This information is not intended to replace advice given to you by your health care provider. Make sure you discuss any questions you have with your health care provider.  Document Revised: 07/22/2022 Document Reviewed: 07/22/2022  Elsevier Patient Education © 2023 Elsevier Inc.    Hospice  Hospice is a service that provides people who are terminally ill and their families with medical, spiritual, and  psychological support. It aims to improve a person's quality of life by keeping the person as comfortable as possible in the final stages of life.  Who makes up the hospice care team?  The following people make up a hospice care team:  The person receiving care and his or her family.  A nurse and a hospice doctor. Your primary health care provider can also be included.  A  or .  A Faith or spiritual leader.  Specialists such as:  A dietitian.  A mental health counselor.  Physical and occupational therapists.  Bereavement coordinator.  Trained volunteers who can help with care.  What services are included in hospice care?  Hospice services can vary, depending on the organization. Generally, they include:  Ways to keep you comfortable, such as:  Providing care in your home or in a home-like setting.  Providing relief from pain and symptoms. The staff will supply all medicines and equipment to keep you comfortable and alert enough to enjoy the company of friends and family.  Working with your loved ones to help them meet your needs and provide much of your basic care. This helps you to enjoy their support.  Visits or care from a nurse and doctor. This may include 24-hour on-call services.  Visits from other specialists who offer services, such as:  Counseling to meet your emotional, spiritual, and social needs as well as those needs of your family.  Massage therapy.  Nutrition therapy.  Physical and occupational therapy.  Art or music therapy.  Spiritual care to meet your needs and your family's needs. It may involve:  Helping you and your family understand the dying process.  Helping you say goodbye to your family and friends.  Performing a specific Faith ceremony or ritual.  Companionship when you are alone.  Allowing you and your family to rest. Hospice staff may do light housekeeping, prepare meals, and run errands.  Short-term inpatient care, if something cannot be managed in the  home.  Bereavement support for grieving family members.  When should hospice care begin?  Most people who use hospice are believed to have 6 months or less to live.  Your family and health care providers can help you decide when hospice services should begin.  If you live longer than 6 months but your condition does not improve, your doctor may be able to approve you for continued hospice care.  If your condition improves, you may discontinue the program.  What should I consider before selecting a program?  Most hospice programs are run by nonprofit, independent organizations. Some are affiliated with hospitals, nursing homes, or home health care agencies. Hospice programs can take place in your home or at a hospice center, hospital, or skilled nursing facility. When choosing a hospice program, ask about the following:  What services are available to me and my loved ones?  What does it cost? Is it covered by insurance?  Is the program reviewed and licensed by the state or certified in some other way?  How will my pain and symptoms be managed?  Will you provide emotional and spiritual support?  If I choose a hospice center or nursing home, where is the hospice center located? Is it convenient for family and friends?  If I choose a hospice center or nursing home, will my family and friends be able to visit any time?  If my circumstances change, can the services be provided in a different setting, such as in my home or in the hospital?  Who makes up the hospice care team? How are they trained or screened?  How involved can my loved ones be?  Whom can my family call with questions?  How is my health care provider involved?  Where can I learn more about hospice?  You can learn about existing hospice programs in your area from your health care providers. The websites of the following organizations have helpful information:  National Hospice and Palliative Care Organization: www.nhpco.org  National Association for Home  Care & Hospice: www.[facility].org  [facility] [employer] [facility] [place]: www.hospicefoundation.org  American Cancer Society: www.cancer.org  eHospice: ehospice.com  These agencies also can provide information:  A local agency on aging.  Your local [facility] chapter.  Your state's department of health or .  Summary  Hospice is a service that provides people who are terminally ill and their families with medical, spiritual, and psychological support.  Hospice aims to improve your quality of life by keeping you as comfortable as possible in the final stages of life.  Hospice teams often include a doctor, nurse, , counselor, Rastafari or spiritual leader, dietitian, therapists, and volunteers.  Hospice care generally includes pain management, visits from doctors and nurses, physical and occupational therapy, nutrition therapy, spiritual and emotional counseling, caregiver support, and bereavement support for grieving family members.  Hospice programs can take place in your home or at a hospice center, hospital, or skilled nursing facility.  This information is not intended to replace advice given to you by your health care provider. Make sure you discuss any questions you have with your health care provider.  Document Revised: 09/21/2021 Document Reviewed: 09/21/2021  Elsevier Patient Education © 2023 Elsevier Inc.

## 2024-01-04 NOTE — CARE PLAN
The patient is Watcher - Medium risk of patient condition declining or worsening    Shift Goals  Clinical Goals: pt will have a bowel movement  Patient Goals: sleep  Family Goals: oksana    Progress made toward(s) clinical / shift goals:  pt was able to rest and showed decreased anxiety during shift. Bowel protocol meds ordered and started for pt.     Patient is not progressing towards the following goals: At this time no bm for pt. Will continue to monitor and medicate per mar to promote bowel movement.

## 2024-01-04 NOTE — CARE PLAN
The patient is Watcher - Medium risk of patient condition declining or worsening    Shift Goals  Clinical Goals: Pt will have improvement in her pain to a 5 or less  Patient Goals: Pt will have improvement in her pain and anxiety.  Family Goals: none present at this time    Progress made toward(s) clinical / shift goals:  Pain and anxiety well controlled with po oxycodone and ativan. Flores to DD. Q 2 turns in place.     Patient is not progressing towards the following goals:

## 2024-01-04 NOTE — DISCHARGE SUMMARY
Discharge Summary    CHIEF COMPLAINT ON ADMISSION  Chief Complaint   Patient presents with    Abdominal Pain     1 month    Bloody Stools     1 week       Reason for Admission  ems     Admission Date  12/29/2023    CODE STATUS  DNAR/DNI    HPI & HOSPITAL COURSE  Ms. Allen is a 63 y.o. female with a history of breast cancer s/p mastectomies, ETOH abuse, chronic back pain who  Presented to the ER on 12/29/23 with generalized wekness and melena with abdominal pain. On presentation she was found to have an Hgb of 2.1 and was hypotensive. She was urgently Intubated and initiated on massive transfusion protocol.  She went for EGD on the same day with findings of a large ~ 6 cm fungating ulcerated mass in the stomach wit possible perforation or fistula formation, biopsy done and consistent with gastric cancer.  Subsequent CT A/P showing a large gastric based mass with diffuse mets to liver and lymph nodes, renal and splenic infarcts noted. GI recommended hospice. Oncology evaluated and recommended comfort care/hospice as patient not candidate system therapy given advanced disease and poor performance status.    She was stabilized and ultimately discharged with home hospice      Therefore, she is discharged in guarded and stable condition to hospice.    The patient met 2-midnight criteria for an inpatient stay at the time of discharge.    Discharge Date  1/4/24    FOLLOW UP ITEMS POST DISCHARGE  Hospice/palliative     DISCHARGE DIAGNOSES  Principal Problem:    Hemorrhagic shock (HCC) (POA: Yes)  Active Problems:    Hypothyroidism (POA: Yes)    History of breast cancer (POA: Yes)    Acute respiratory failure with hypoxia (HCC) (POA: Unknown)    Lactic acidosis (POA: Unknown)    Severe anemia (POA: Unknown)    Acute kidney injury (HCC) (POA: Unknown)    Acute encephalopathy (POA: Unknown)    Abnormal transaminases (POA: Unknown)    Leucocytosis (POA: Unknown)    Gastric cancer (HCC) (POA: Unknown)    Hospice care (POA:  Unknown)    Severe protein-calorie malnutrition (HCC) (POA: Unknown)  Resolved Problems:    * No resolved hospital problems. *      FOLLOW UP  No future appointments.  No follow-up provider specified.    MEDICATIONS ON DISCHARGE     Medication List        START taking these medications        Instructions   LORazepam 1 MG Tabs  Commonly known as: Ativan   Take 1 Tablet by mouth every four hours as needed for Anxiety for up to 5 days.  Dose: 1 mg     omeprazole 20 MG delayed-release capsule  Commonly known as: PriLOSEC   Take 2 Capsules by mouth every day.  Dose: 40 mg     oxyCODONE immediate-release 5 MG Tabs  Commonly known as: Roxicodone   Take 1 Tablet by mouth every four hours as needed for Severe Pain for up to 5 days.  Dose: 5 mg     senna-docusate 8.6-50 MG Tabs  Commonly known as: Pericolace Or Senokot S   Take 2 Tablets by mouth 2 times a day.  Dose: 2 Tablet            CONTINUE taking these medications        Instructions   albuterol 108 (90 Base) MCG/ACT Aers inhalation aerosol   Inhale 2 Puffs by mouth every 6 hours as needed for Shortness of Breath.  Dose: 2 Puff     levothyroxine 100 MCG Tabs  Commonly known as: Synthroid   TAKE 1 TAB BY MOUTH EVERY DAY.     venlafaxine 100 MG tablet  Commonly known as: Effexor   Doctor's comments: This is the last refill before patient is seen. Please inform patient.  Take 1 Tab by mouth 2 Times a Day. TAKE 1 TABLET BY MOUTH TWICE A DAY  Dose: 100 mg            STOP taking these medications      ALPRAZolam 0.5 MG Tabs  Commonly known as: Xanax     aspirin EC 81 MG Tbec  Commonly known as: Ecotrin     azithromycin 250 MG Tabs  Commonly known as: Zithromax     cyclobenzaprine 10 mg Tabs  Commonly known as: Flexeril     HYDROcodone-acetaminophen 5-325 MG Tabs per tablet  Commonly known as: Norco     methylPREDNISolone 4 MG tablet  Commonly known as: Medrol (Daon)     moxifloxacin 0.5 % Soln  Commonly known as: Vigamox     TYLENOL PM EXTRA STRENGTH PO               Allergies  Allergies   Allergen Reactions    Penicillins     Sulfa Drugs        DIET  Orders Placed This Encounter   Procedures    Diet Order Diet: Regular     Standing Status:   Standing     Number of Occurrences:   1     Order Specific Question:   Diet:     Answer:   Regular [1]       ACTIVITY  As tolerated.      CONSULTATIONS  Critical Care   Gastroenterology   Oncology   Palliative care    PROCEDURES  EGD 12/29/23  Intubation/extubation   Arterial catheter insertion 12/29    LABORATORY  Lab Results   Component Value Date    SODIUM 135 01/03/2024    POTASSIUM 3.6 01/03/2024    CHLORIDE 104 01/03/2024    CO2 22 01/03/2024    GLUCOSE 121 (H) 01/03/2024    BUN 18 01/03/2024    CREATININE 0.50 01/03/2024        Lab Results   Component Value Date    WBC 23.1 (H) 01/04/2024    HEMOGLOBIN 7.6 (L) 01/04/2024    HEMATOCRIT 24.8 (L) 01/04/2024    PLATELETCT 228 01/04/2024        Total time of the discharge process exceeds 35 minutes.

## 2024-01-04 NOTE — CARE PLAN
The patient is Watcher - Medium risk of patient condition declining or worsening    Shift Goals  Clinical Goals: Pt will discharge home.  Patient Goals: Pt will be able to rest comfortably  Family Goals: Pt and boyfriend will be able to receive some training from physical therapy    Progress made toward(s) clinical / shift goals:  ***    Patient is not progressing towards the following goals:

## 2024-01-04 NOTE — DISCHARGE PLANNING
1011  Agency/Facility Name: [facility]   Spoke To: Jb  Outcome: DPA informed Jb that Pt is set for transport time with GMT today @ 1200 pm.

## 2024-01-05 NOTE — THERAPY
Physical Therapy   Daily Treatment     Patient Name: Natalya Allen  Age:  63 y.o., Sex:  female  Medical Record #: [id number]  Today's Date: 1/4/2024     Precautions  Precautions: Fall Risk    Assessment    Pt with continued significant weakness; seen for caregiver training; he is very committed but overwhelmed and tearful; discussed/demo'd body mechanics and quality of life goals to reduce discomfort; he initially declined medical transport but after seeing weakness is requesting if not too late as they have two stairs to enter home, communicated to social work; will follow if remains in house for continued caregiver traiing.     Plan    Treatment Plan Status: Continue Current Treatment Plan  Type of Treatment: Bed Mobility, Neuro Re-Education / Balance, Equipment, Self Care / Home Evaluation, Therapeutic Activities  Treatment Frequency: 3 Times per Week  Treatment Duration: Until Therapy Goals Met    DC Equipment Recommendations:  (SO reports hospice is providing a BSC, transport w/c, he declined hospital bed but he can clean the house reports house is a bit cluttered   Discharge Recommendations: pt discharging with hospice today       Abridged Subjective/Objective     01/04/24 0935   Cognition    Cognition / Consciousness X   Speech/ Communication Delayed Responses   Level of Consciousness Alert   Safety Awareness Impaired   New Learning Impaired   Attention Impaired   Comments boyfriend at bedside for training; intermittently tearful; very thankful but limited internalization as to how immenient she may be; he just wants her to get home to 'say bye to the dogs' one last time;   Balance   Sitting Balance (Static) Fair   Sitting Balance (Dynamic) Fair -   Standing Balance (Static) Poor +   Standing Balance (Dynamic) Poor   Weight Shift Sitting Poor   Weight Shift Standing Poor   Skilled Intervention Tactile Cuing;Sequencing;Verbal Cuing   Comments B UE support in sitting/standing; able to march inplace but cannot  leave bedside, anna frequently   Bed Mobility    Supine to Sit Minimal Assist   Sit to Supine Minimal Assist   Comments demo'd body mechanics, use of dane and positioning of arms to not pull on shoulders   Gait Analysis   Gait Level Of Assist Unable to Participate   Comments was able to march two sit<>stands wiht FWW   Functional Mobility   Sit to Stand Moderate Assist  (of 2 with FWW)   Bed, Chair, Wheelchair Transfer   (deferrred transfer 2/2 dc home today)   Short Term Goals    Short Term Goal # 1 Pt will perform bed mobility with supervision to progress function in 6 visits.   Goal Outcome # 1 goal not met   Short Term Goal # 2 Pt will transfer with FWW and min A to progress function and decrease burden of care in 6 visits.   Goal Outcome # 2 Goal not met   Short Term Goal # 3 Pt's significant other will demonstrate ability to assist pt with stand step transfer without cues to be able to assist at home in 6 visits.   Goal Outcome # 3 Goal not met

## 2024-05-23 NOTE — PROCEDURES
Arterial Catheter Procedure Note    Date: 12/29/2023    Procedure:  Arterial Catheter Insertion    Indication: Hemorrhagic shock    Physician:  Dr. Rick Ramos MD    Consent:  Emergent    Procedure:  The patient is intubated and on full mechanical vent support with hemorrhagic shock, status post massive transfusion protocol.  Arterial catheter is indicated for continuous invasive BP monitoring to titrate vasoactive agents.  The right axilla was prepped and draped using sterile barrier precautions.  Radial arteries are very small therefore axillary site was chosen.  Using ultrasound guidance a 20-gauge, 12 cm arterial catheter was placed in the right axillary artery on the first attempt without difficulty.  A good quality arterial waveform was noted on monitor.  The catheter ws sutured in place and a sterile dressing was applied.  No immediate complications.  EBL < 5 cc.     Most recent lipid profile was reviewed.  Patient's cholesterol is well controlled on the current regimen.  Recommended continuation of medication and dietary guidelines.

## (undated) DEVICE — KIT CUSTOM PROCEDURE SINGLE FOR ENDO  (15/CA)

## (undated) DEVICE — AGENT HEMOSTATIC ENDOCLOT PHS 3G (1EA)

## (undated) DEVICE — FORCEP RADIAL JAW 4 STANDARD CAPACITY W/NEEDLE 240CM (40EA/BX)

## (undated) DEVICE — FILM CASSETTE ENDO

## (undated) DEVICE — BLOCK BITE MAXI DENTAL RETENTION RIM (100EA/BX)